# Patient Record
Sex: FEMALE
[De-identification: names, ages, dates, MRNs, and addresses within clinical notes are randomized per-mention and may not be internally consistent; named-entity substitution may affect disease eponyms.]

---

## 2020-01-09 ENCOUNTER — RESULT REVIEW (OUTPATIENT)
Age: 60
End: 2020-01-09

## 2020-01-09 ENCOUNTER — OUTPATIENT (OUTPATIENT)
Dept: OUTPATIENT SERVICES | Facility: HOSPITAL | Age: 60
LOS: 1 days | Discharge: HOME | End: 2020-01-09
Payer: COMMERCIAL

## 2020-01-09 VITALS
SYSTOLIC BLOOD PRESSURE: 126 MMHG | RESPIRATION RATE: 17 BRPM | DIASTOLIC BLOOD PRESSURE: 63 MMHG | HEART RATE: 67 BPM | OXYGEN SATURATION: 97 %

## 2020-01-09 VITALS
OXYGEN SATURATION: 98 % | DIASTOLIC BLOOD PRESSURE: 81 MMHG | SYSTOLIC BLOOD PRESSURE: 155 MMHG | HEART RATE: 80 BPM | HEIGHT: 62 IN | TEMPERATURE: 98 F | RESPIRATION RATE: 15 BRPM | WEIGHT: 145.06 LBS

## 2020-01-09 DIAGNOSIS — C18.7 MALIGNANT NEOPLASM OF SIGMOID COLON: ICD-10-CM

## 2020-01-09 PROCEDURE — 88305 TISSUE EXAM BY PATHOLOGIST: CPT | Mod: 26

## 2020-01-28 ENCOUNTER — OUTPATIENT (OUTPATIENT)
Dept: OUTPATIENT SERVICES | Facility: HOSPITAL | Age: 60
LOS: 1 days | End: 2020-01-28
Payer: COMMERCIAL

## 2020-01-28 VITALS
DIASTOLIC BLOOD PRESSURE: 80 MMHG | OXYGEN SATURATION: 97 % | HEIGHT: 62 IN | RESPIRATION RATE: 16 BRPM | HEART RATE: 77 BPM | TEMPERATURE: 97 F | SYSTOLIC BLOOD PRESSURE: 140 MMHG | WEIGHT: 147.93 LBS

## 2020-01-28 DIAGNOSIS — E03.9 HYPOTHYROIDISM, UNSPECIFIED: ICD-10-CM

## 2020-01-28 DIAGNOSIS — Z98.890 OTHER SPECIFIED POSTPROCEDURAL STATES: Chronic | ICD-10-CM

## 2020-01-28 DIAGNOSIS — C18.7 MALIGNANT NEOPLASM OF SIGMOID COLON: ICD-10-CM

## 2020-01-28 LAB
ANION GAP SERPL CALC-SCNC: 13 MMO/L — SIGNIFICANT CHANGE UP (ref 7–14)
BLD GP AB SCN SERPL QL: NEGATIVE — SIGNIFICANT CHANGE UP
BUN SERPL-MCNC: 13 MG/DL — SIGNIFICANT CHANGE UP (ref 7–23)
CALCIUM SERPL-MCNC: 8.8 MG/DL — SIGNIFICANT CHANGE UP (ref 8.4–10.5)
CHLORIDE SERPL-SCNC: 102 MMOL/L — SIGNIFICANT CHANGE UP (ref 98–107)
CO2 SERPL-SCNC: 28 MMOL/L — SIGNIFICANT CHANGE UP (ref 22–31)
CREAT SERPL-MCNC: 0.88 MG/DL — SIGNIFICANT CHANGE UP (ref 0.5–1.3)
GLUCOSE SERPL-MCNC: 122 MG/DL — HIGH (ref 70–99)
HBA1C BLD-MCNC: 6 % — HIGH (ref 4–5.6)
HCT VFR BLD CALC: 41.6 % — SIGNIFICANT CHANGE UP (ref 34.5–45)
HGB BLD-MCNC: 13.4 G/DL — SIGNIFICANT CHANGE UP (ref 11.5–15.5)
MCHC RBC-ENTMCNC: 29.1 PG — SIGNIFICANT CHANGE UP (ref 27–34)
MCHC RBC-ENTMCNC: 32.2 % — SIGNIFICANT CHANGE UP (ref 32–36)
MCV RBC AUTO: 90.2 FL — SIGNIFICANT CHANGE UP (ref 80–100)
NRBC # FLD: 0 K/UL — SIGNIFICANT CHANGE UP (ref 0–0)
PLATELET # BLD AUTO: 200 K/UL — SIGNIFICANT CHANGE UP (ref 150–400)
PMV BLD: 12.8 FL — SIGNIFICANT CHANGE UP (ref 7–13)
POTASSIUM SERPL-MCNC: 4.4 MMOL/L — SIGNIFICANT CHANGE UP (ref 3.5–5.3)
POTASSIUM SERPL-SCNC: 4.4 MMOL/L — SIGNIFICANT CHANGE UP (ref 3.5–5.3)
RBC # BLD: 4.61 M/UL — SIGNIFICANT CHANGE UP (ref 3.8–5.2)
RBC # FLD: 13.2 % — SIGNIFICANT CHANGE UP (ref 10.3–14.5)
RH IG SCN BLD-IMP: POSITIVE — SIGNIFICANT CHANGE UP
SODIUM SERPL-SCNC: 143 MMOL/L — SIGNIFICANT CHANGE UP (ref 135–145)
WBC # BLD: 8.16 K/UL — SIGNIFICANT CHANGE UP (ref 3.8–10.5)
WBC # FLD AUTO: 8.16 K/UL — SIGNIFICANT CHANGE UP (ref 3.8–10.5)

## 2020-01-28 PROCEDURE — 93010 ELECTROCARDIOGRAM REPORT: CPT

## 2020-01-28 NOTE — H&P PST ADULT - NEGATIVE MUSCULOSKELETAL SYMPTOMS
no muscle cramps/no back pain/no joint swelling/no myalgia/no arm pain L/no arm pain R/no neck pain/no leg pain R/no leg pain L

## 2020-01-28 NOTE — H&P PST ADULT - RS GEN PE MLT RESP DETAILS PC
no rhonchi/breath sounds equal/no rales/no wheezes/airway patent/respirations non-labored/good air movement

## 2020-01-28 NOTE — H&P PST ADULT - HISTORY OF PRESENT ILLNESS
malignant neoplasm of the sigmoid colon This 59 year old with history of malignant neoplasm of the sigmoid colon found on recent screening colonoscopy 12/2019,   presents to [facility] for evaluation scheduled laparoscopic sigmoid resection on 2/3/2020.

## 2020-01-28 NOTE — H&P PST ADULT - NSICDXPASTMEDICALHX_GEN_ALL_CORE_FT
PAST MEDICAL HISTORY:  H/O hypercalcemia 2011    Hypothyroid     Malignant neoplasm of sigmoid colon 12/2019    S/P colonoscopy x 2 :12/2019/1/2020

## 2020-01-28 NOTE — H&P PST ADULT - NEGATIVE NEUROLOGICAL SYMPTOMS
no loss of sensation/no headache/no syncope/no difficulty walking/no loss of consciousness/no paresthesias/no weakness/no generalized seizures/no confusion/no focal seizures

## 2020-01-28 NOTE — H&P PST ADULT - NEGATIVE ENMT SYMPTOMS
no dysphagia/no hearing difficulty/no throat pain/no ear pain/no sinus symptoms/no nose bleeds/no gum bleeding

## 2020-01-28 NOTE — H&P PST ADULT - NEGATIVE OPHTHALMOLOGIC SYMPTOMS
no blurred vision L/no blurred vision R/no pain R/no loss of vision L/no diplopia/no discharge L/no pain L/no loss of vision R/no discharge R

## 2020-01-28 NOTE — H&P PST ADULT - LANGUAGE ASSISTANCE NEEDED
Patient understands and verbalizes in English, and has requested her daughter to translate information as well

## 2020-01-28 NOTE — H&P PST ADULT - NSICDXPROBLEM_GEN_ALL_CORE_FT
PROBLEM DIAGNOSES  Problem: Hypothyroid  Assessment and Plan: Pt instructed to take their home medication levothyroxine the morning of surgery with a sip of water, pt able to verbalize understanding.     Problem: Malignant neoplasm of sigmoid colon  Assessment and Plan: Patient is scheduled for laparoscopic sigmoid resection on 2/3/2020. Pre-op instructions provided. Pt given verbal and written instructions with teach back on chlorhexidine soap and pepcid. Pt verbalized understanding with return demonstration.   Patient's surgeon has requested medical evaluation, PST to request copy of evaluation PROBLEM DIAGNOSES  Problem: Malignant neoplasm of sigmoid colon  Assessment and Plan: Patient is scheduled for laparoscopic sigmoid resection on 2/3/2020. Pre-op instructions provided. Pt given verbal and written instructions with teach back on chlorhexidine soap and pepcid. Pt verbalized understanding with return demonstration.   Patient's surgeon has requested medical evaluation, PST to request copy of evaluation       Problem: Hypothyroid  Assessment and Plan: Pt instructed to take their home medication levothyroxine the morning of surgery with a sip of water, pt able to verbalize understanding.

## 2020-01-31 ENCOUNTER — APPOINTMENT (OUTPATIENT)
Dept: CARDIOLOGY | Facility: CLINIC | Age: 60
End: 2020-01-31

## 2020-02-02 ENCOUNTER — TRANSCRIPTION ENCOUNTER (OUTPATIENT)
Age: 60
End: 2020-02-02

## 2020-02-03 ENCOUNTER — INPATIENT (INPATIENT)
Facility: HOSPITAL | Age: 60
LOS: 3 days | Discharge: HOME | End: 2020-02-07
Attending: SURGERY | Admitting: SURGERY
Payer: COMMERCIAL

## 2020-02-03 ENCOUNTER — RESULT REVIEW (OUTPATIENT)
Age: 60
End: 2020-02-03

## 2020-02-03 VITALS
SYSTOLIC BLOOD PRESSURE: 153 MMHG | TEMPERATURE: 98 F | OXYGEN SATURATION: 98 % | WEIGHT: 147.93 LBS | DIASTOLIC BLOOD PRESSURE: 60 MMHG | HEART RATE: 77 BPM | HEIGHT: 62 IN | RESPIRATION RATE: 16 BRPM

## 2020-02-03 DIAGNOSIS — C18.7 MALIGNANT NEOPLASM OF SIGMOID COLON: ICD-10-CM

## 2020-02-03 DIAGNOSIS — Z98.890 OTHER SPECIFIED POSTPROCEDURAL STATES: Chronic | ICD-10-CM

## 2020-02-03 LAB
GLUCOSE BLDC GLUCOMTR-MCNC: 102 MG/DL — HIGH (ref 70–99)
RH IG SCN BLD-IMP: POSITIVE — SIGNIFICANT CHANGE UP

## 2020-02-03 PROCEDURE — 88342 IMHCHEM/IMCYTCHM 1ST ANTB: CPT | Mod: 26

## 2020-02-03 PROCEDURE — 88341 IMHCHEM/IMCYTCHM EA ADD ANTB: CPT | Mod: 26

## 2020-02-03 PROCEDURE — 88305 TISSUE EXAM BY PATHOLOGIST: CPT | Mod: 26

## 2020-02-03 PROCEDURE — 88309 TISSUE EXAM BY PATHOLOGIST: CPT | Mod: 26

## 2020-02-03 RX ORDER — DIPHENHYDRAMINE HCL 50 MG
25 CAPSULE ORAL EVERY 4 HOURS
Refills: 0 | Status: DISCONTINUED | OUTPATIENT
Start: 2020-02-03 | End: 2020-02-05

## 2020-02-03 RX ORDER — MULTIVIT-MIN/FERROUS GLUCONATE 9 MG/15 ML
1 LIQUID (ML) ORAL
Qty: 0 | Refills: 0 | DISCHARGE

## 2020-02-03 RX ORDER — ACETAMINOPHEN 500 MG
2 TABLET ORAL
Qty: 0 | Refills: 0 | DISCHARGE

## 2020-02-03 RX ORDER — HYDROMORPHONE HYDROCHLORIDE 2 MG/ML
30 INJECTION INTRAMUSCULAR; INTRAVENOUS; SUBCUTANEOUS
Refills: 0 | Status: DISCONTINUED | OUTPATIENT
Start: 2020-02-03 | End: 2020-02-05

## 2020-02-03 RX ORDER — METRONIDAZOLE 500 MG
500 TABLET ORAL EVERY 8 HOURS
Refills: 0 | Status: COMPLETED | OUTPATIENT
Start: 2020-02-03 | End: 2020-02-04

## 2020-02-03 RX ORDER — LEVOTHYROXINE SODIUM 125 MCG
1 TABLET ORAL
Qty: 0 | Refills: 0 | DISCHARGE

## 2020-02-03 RX ORDER — KETOROLAC TROMETHAMINE 30 MG/ML
30 SYRINGE (ML) INJECTION EVERY 6 HOURS
Refills: 0 | Status: DISCONTINUED | OUTPATIENT
Start: 2020-02-04 | End: 2020-02-04

## 2020-02-03 RX ORDER — HYDROMORPHONE HYDROCHLORIDE 2 MG/ML
0.5 INJECTION INTRAMUSCULAR; INTRAVENOUS; SUBCUTANEOUS
Refills: 0 | Status: DISCONTINUED | OUTPATIENT
Start: 2020-02-03 | End: 2020-02-05

## 2020-02-03 RX ORDER — NALOXONE HYDROCHLORIDE 4 MG/.1ML
0.1 SPRAY NASAL
Refills: 0 | Status: DISCONTINUED | OUTPATIENT
Start: 2020-02-03 | End: 2020-02-05

## 2020-02-03 RX ORDER — ENOXAPARIN SODIUM 100 MG/ML
40 INJECTION SUBCUTANEOUS DAILY
Refills: 0 | Status: DISCONTINUED | OUTPATIENT
Start: 2020-02-03 | End: 2020-02-07

## 2020-02-03 RX ORDER — CIPROFLOXACIN LACTATE 400MG/40ML
400 VIAL (ML) INTRAVENOUS EVERY 12 HOURS
Refills: 0 | Status: COMPLETED | OUTPATIENT
Start: 2020-02-03 | End: 2020-02-04

## 2020-02-03 RX ORDER — HYDROMORPHONE HYDROCHLORIDE 2 MG/ML
0.5 INJECTION INTRAMUSCULAR; INTRAVENOUS; SUBCUTANEOUS
Refills: 0 | Status: DISCONTINUED | OUTPATIENT
Start: 2020-02-03 | End: 2020-02-04

## 2020-02-03 RX ORDER — CHOLECALCIFEROL (VITAMIN D3) 125 MCG
1 CAPSULE ORAL
Qty: 0 | Refills: 0 | DISCHARGE

## 2020-02-03 RX ORDER — ONDANSETRON 8 MG/1
4 TABLET, FILM COATED ORAL EVERY 6 HOURS
Refills: 0 | Status: DISCONTINUED | OUTPATIENT
Start: 2020-02-03 | End: 2020-02-05

## 2020-02-03 RX ORDER — SODIUM CHLORIDE 9 MG/ML
1000 INJECTION, SOLUTION INTRAVENOUS
Refills: 0 | Status: DISCONTINUED | OUTPATIENT
Start: 2020-02-03 | End: 2020-02-04

## 2020-02-03 RX ORDER — ONDANSETRON 8 MG/1
4 TABLET, FILM COATED ORAL ONCE
Refills: 0 | Status: DISCONTINUED | OUTPATIENT
Start: 2020-02-03 | End: 2020-02-07

## 2020-02-03 RX ORDER — ACETAMINOPHEN 500 MG
1000 TABLET ORAL EVERY 6 HOURS
Refills: 0 | Status: COMPLETED | OUTPATIENT
Start: 2020-02-03 | End: 2020-02-04

## 2020-02-03 RX ORDER — ACETAMINOPHEN 500 MG
1000 TABLET ORAL EVERY 6 HOURS
Refills: 0 | Status: DISCONTINUED | OUTPATIENT
Start: 2020-02-03 | End: 2020-02-03

## 2020-02-03 RX ADMIN — Medication 400 MILLIGRAM(S): at 23:36

## 2020-02-03 RX ADMIN — ENOXAPARIN SODIUM 40 MILLIGRAM(S): 100 INJECTION SUBCUTANEOUS at 21:10

## 2020-02-03 RX ADMIN — SODIUM CHLORIDE 30 MILLILITER(S): 9 INJECTION, SOLUTION INTRAVENOUS at 13:47

## 2020-02-03 RX ADMIN — HYDROMORPHONE HYDROCHLORIDE 0.5 MILLIGRAM(S): 2 INJECTION INTRAMUSCULAR; INTRAVENOUS; SUBCUTANEOUS at 19:45

## 2020-02-03 RX ADMIN — HYDROMORPHONE HYDROCHLORIDE 0.5 MILLIGRAM(S): 2 INJECTION INTRAMUSCULAR; INTRAVENOUS; SUBCUTANEOUS at 19:24

## 2020-02-03 RX ADMIN — HYDROMORPHONE HYDROCHLORIDE 30 MILLILITER(S): 2 INJECTION INTRAMUSCULAR; INTRAVENOUS; SUBCUTANEOUS at 19:30

## 2020-02-03 RX ADMIN — Medication 200 MILLIGRAM(S): at 21:07

## 2020-02-03 NOTE — ASU PATIENT PROFILE, ADULT - PMH
H/O hypercalcemia  2011  Hypothyroid    Malignant neoplasm of sigmoid colon  12/2019  S/P colonoscopy  x 2 :12/2019/1/2020

## 2020-02-03 NOTE — BRIEF OPERATIVE NOTE - OPERATION/FINDINGS
Laparoscopic-assisted sigmoid resection performed. The ink injection was identified on the sigmoid colon. The splenic flexure was mobilized, but the IMV was not divided. A side-to-end, stapled, Baker's anastomosis of descending colon to sigmoid cuff performed. Blind end of descending colon and colo-sigmoid anastomosis over-sewn with interrupted silk sutures. Rigid sigmoidoscopy performed - no fresh blood within rectum/sigmoid, and no air leak present. Omentum was draped into pelvis over anastomosis.

## 2020-02-03 NOTE — ASU PATIENT PROFILE, ADULT - HEALTH/HEALTHCARE ANXIETIES, PROFILE
[Cardiac Auscultation] : normal cardiac auscultation  [Respiratory Effort] : normal respiratory effort [Auscultation] : lungs clear to auscultation [Liver] : normal liver [Spleen] : normal spleen [Muscle Strength] : normal muscle strength [Gait] : normal gait [Grossly Intact] : grossly intact [Normal] : normal [Peripheral Edema] : no peripheral edema  [Tenderness] : non tender [FreeTextEntry1] : obese. +Cushingoid [de-identified] : no signs of arthritis. +hypermobile. +pes planus none

## 2020-02-03 NOTE — CHART NOTE - NSCHARTNOTEFT_GEN_A_CORE
POST-OPERATIVE NOTE    Patient is s/p laparoscopic assisted sigmoid resectionwith side to end Baker's anastamosis    Subjective:  Patient reports mild pain at the incisional site, controlled with medication  Denies chest pain, shortness of breath, nausea, vomiting  Is not yet passing gas or having bowel movements  Not yet urinating independently (jimenes) or ambulating independently    Vital Signs Last 24 Hrs  T(C): 36.9 (03 Feb 2020 20:57), Max: 36.9 (03 Feb 2020 13:07)  T(F): 98.4 (03 Feb 2020 20:57), Max: 98.4 (03 Feb 2020 13:07)  HR: 66 (03 Feb 2020 22:00) (65 - 77)  BP: 131/59 (03 Feb 2020 20:57) (120/59 - 153/60)  BP(mean): 75 (03 Feb 2020 20:57) (70 - 82)  RR: 16 (03 Feb 2020 22:00) (13 - 21)  SpO2: 100% (03 Feb 2020 22:00) (94% - 100%)  I&O's Detail    03 Feb 2020 07:01  -  03 Feb 2020 23:00  --------------------------------------------------------  IN:    lactated ringers.: 400 mL  Total IN: 400 mL    OUT:    Bulb: 43 mL    Indwelling Catheter - Urethral: 280 mL  Total OUT: 323 mL    Total NET: 77 mL        ciprofloxacin   IVPB 400  metroNIDAZOLE  IVPB 500  ciprofloxacin   IVPB 400  enoxaparin Injectable 40  metroNIDAZOLE  IVPB 500    PAST MEDICAL & SURGICAL HISTORY:  S/P colonoscopy: x 2 :12/2019/1/2020  H/O hypercalcemia: 2011  Hypothyroid  Malignant neoplasm of sigmoid colon: 12/2019  S/P parathyroidectomy: 2011  S/P partial thyroidectomy: 2011        Physical Exam:  General: NAD, resting comfortably in bed  Pulmonary: Nonlabored breathing, no respiratory distress  Abdominal: soft, tender around the midline incision but not always, nondistended. laparoscopic incisions covered, CDI; Midline incision covered with dressing, very minor seepage. DAWSON RLQ ss output, site CDI.  Extremities: WWP      Assessment:  The patient is a 59y Female who is now several hours post-op from a  laparoscopic assisted sigmoid resection with side to end Baker's anastomosis, recovering appropriately in the PACU.    Plan:  - OK to be transferred to floor  - Pain control as needed, PCA  - continues NPO/IVF @100cc  - contineu IV antibitoics 24hrs (cipro/flagyl)  - DVT ppx lovenox  - OOB and ambulating as tolerated

## 2020-02-04 LAB
ANION GAP SERPL CALC-SCNC: 11 MMO/L — SIGNIFICANT CHANGE UP (ref 7–14)
BASOPHILS # BLD AUTO: 0.02 K/UL — SIGNIFICANT CHANGE UP (ref 0–0.2)
BASOPHILS NFR BLD AUTO: 0.2 % — SIGNIFICANT CHANGE UP (ref 0–2)
BUN SERPL-MCNC: 8 MG/DL — SIGNIFICANT CHANGE UP (ref 7–23)
CALCIUM SERPL-MCNC: 7.8 MG/DL — LOW (ref 8.4–10.5)
CHLORIDE SERPL-SCNC: 104 MMOL/L — SIGNIFICANT CHANGE UP (ref 98–107)
CO2 SERPL-SCNC: 24 MMOL/L — SIGNIFICANT CHANGE UP (ref 22–31)
CREAT SERPL-MCNC: 0.64 MG/DL — SIGNIFICANT CHANGE UP (ref 0.5–1.3)
EOSINOPHIL # BLD AUTO: 0 K/UL — SIGNIFICANT CHANGE UP (ref 0–0.5)
EOSINOPHIL NFR BLD AUTO: 0 % — SIGNIFICANT CHANGE UP (ref 0–6)
GLUCOSE SERPL-MCNC: 162 MG/DL — HIGH (ref 70–99)
HCT VFR BLD CALC: 36.2 % — SIGNIFICANT CHANGE UP (ref 34.5–45)
HGB BLD-MCNC: 11.9 G/DL — SIGNIFICANT CHANGE UP (ref 11.5–15.5)
IMM GRANULOCYTES NFR BLD AUTO: 0.8 % — SIGNIFICANT CHANGE UP (ref 0–1.5)
LYMPHOCYTES # BLD AUTO: 1.08 K/UL — SIGNIFICANT CHANGE UP (ref 1–3.3)
LYMPHOCYTES # BLD AUTO: 9.7 % — LOW (ref 13–44)
MAGNESIUM SERPL-MCNC: 1.6 MG/DL — SIGNIFICANT CHANGE UP (ref 1.6–2.6)
MCHC RBC-ENTMCNC: 29.2 PG — SIGNIFICANT CHANGE UP (ref 27–34)
MCHC RBC-ENTMCNC: 32.9 % — SIGNIFICANT CHANGE UP (ref 32–36)
MCV RBC AUTO: 88.9 FL — SIGNIFICANT CHANGE UP (ref 80–100)
MONOCYTES # BLD AUTO: 0.6 K/UL — SIGNIFICANT CHANGE UP (ref 0–0.9)
MONOCYTES NFR BLD AUTO: 5.4 % — SIGNIFICANT CHANGE UP (ref 2–14)
NEUTROPHILS # BLD AUTO: 9.36 K/UL — HIGH (ref 1.8–7.4)
NEUTROPHILS NFR BLD AUTO: 83.9 % — HIGH (ref 43–77)
NRBC # FLD: 0 K/UL — SIGNIFICANT CHANGE UP (ref 0–0)
PHOSPHATE SERPL-MCNC: 4 MG/DL — SIGNIFICANT CHANGE UP (ref 2.5–4.5)
PLATELET # BLD AUTO: 159 K/UL — SIGNIFICANT CHANGE UP (ref 150–400)
PMV BLD: 12.4 FL — SIGNIFICANT CHANGE UP (ref 7–13)
POTASSIUM SERPL-MCNC: 4.6 MMOL/L — SIGNIFICANT CHANGE UP (ref 3.5–5.3)
POTASSIUM SERPL-SCNC: 4.6 MMOL/L — SIGNIFICANT CHANGE UP (ref 3.5–5.3)
RBC # BLD: 4.07 M/UL — SIGNIFICANT CHANGE UP (ref 3.8–5.2)
RBC # FLD: 13.3 % — SIGNIFICANT CHANGE UP (ref 10.3–14.5)
SODIUM SERPL-SCNC: 139 MMOL/L — SIGNIFICANT CHANGE UP (ref 135–145)
WBC # BLD: 11.15 K/UL — HIGH (ref 3.8–10.5)
WBC # FLD AUTO: 11.15 K/UL — HIGH (ref 3.8–10.5)

## 2020-02-04 RX ORDER — SODIUM CHLORIDE 9 MG/ML
1000 INJECTION, SOLUTION INTRAVENOUS
Refills: 0 | Status: DISCONTINUED | OUTPATIENT
Start: 2020-02-04 | End: 2020-02-05

## 2020-02-04 RX ORDER — MAGNESIUM SULFATE 500 MG/ML
2 VIAL (ML) INJECTION ONCE
Refills: 0 | Status: COMPLETED | OUTPATIENT
Start: 2020-02-04 | End: 2020-02-04

## 2020-02-04 RX ORDER — LEVOTHYROXINE SODIUM 125 MCG
75 TABLET ORAL DAILY
Refills: 0 | Status: DISCONTINUED | OUTPATIENT
Start: 2020-02-04 | End: 2020-02-07

## 2020-02-04 RX ADMIN — HYDROMORPHONE HYDROCHLORIDE 30 MILLILITER(S): 2 INJECTION INTRAMUSCULAR; INTRAVENOUS; SUBCUTANEOUS at 08:41

## 2020-02-04 RX ADMIN — Medication 400 MILLIGRAM(S): at 11:54

## 2020-02-04 RX ADMIN — Medication 30 MILLIGRAM(S): at 03:20

## 2020-02-04 RX ADMIN — ENOXAPARIN SODIUM 40 MILLIGRAM(S): 100 INJECTION SUBCUTANEOUS at 11:53

## 2020-02-04 RX ADMIN — SODIUM CHLORIDE 100 MILLILITER(S): 9 INJECTION, SOLUTION INTRAVENOUS at 10:23

## 2020-02-04 RX ADMIN — Medication 100 MILLIGRAM(S): at 16:48

## 2020-02-04 RX ADMIN — Medication 30 MILLIGRAM(S): at 14:18

## 2020-02-04 RX ADMIN — Medication 30 MILLIGRAM(S): at 21:49

## 2020-02-04 RX ADMIN — Medication 30 MILLIGRAM(S): at 02:50

## 2020-02-04 RX ADMIN — Medication 200 MILLIGRAM(S): at 06:33

## 2020-02-04 RX ADMIN — Medication 1000 MILLIGRAM(S): at 12:25

## 2020-02-04 RX ADMIN — Medication 1000 MILLIGRAM(S): at 17:30

## 2020-02-04 RX ADMIN — Medication 30 MILLIGRAM(S): at 08:02

## 2020-02-04 RX ADMIN — Medication 100 MILLIGRAM(S): at 07:32

## 2020-02-04 RX ADMIN — HYDROMORPHONE HYDROCHLORIDE 30 MILLILITER(S): 2 INJECTION INTRAMUSCULAR; INTRAVENOUS; SUBCUTANEOUS at 20:13

## 2020-02-04 RX ADMIN — HYDROMORPHONE HYDROCHLORIDE 30 MILLILITER(S): 2 INJECTION INTRAMUSCULAR; INTRAVENOUS; SUBCUTANEOUS at 01:42

## 2020-02-04 RX ADMIN — Medication 30 MILLIGRAM(S): at 07:32

## 2020-02-04 RX ADMIN — Medication 50 GRAM(S): at 10:23

## 2020-02-04 RX ADMIN — Medication 1000 MILLIGRAM(S): at 06:45

## 2020-02-04 RX ADMIN — Medication 400 MILLIGRAM(S): at 06:30

## 2020-02-04 RX ADMIN — Medication 30 MILLIGRAM(S): at 14:48

## 2020-02-04 RX ADMIN — Medication 30 MILLIGRAM(S): at 21:34

## 2020-02-04 RX ADMIN — Medication 100 MILLIGRAM(S): at 00:04

## 2020-02-04 RX ADMIN — Medication 400 MILLIGRAM(S): at 16:59

## 2020-02-04 RX ADMIN — Medication 200 MILLIGRAM(S): at 17:52

## 2020-02-04 RX ADMIN — HYDROMORPHONE HYDROCHLORIDE 30 MILLILITER(S): 2 INJECTION INTRAMUSCULAR; INTRAVENOUS; SUBCUTANEOUS at 01:41

## 2020-02-04 NOTE — PHYSICAL THERAPY INITIAL EVALUATION ADULT - PERTINENT HX OF CURRENT PROBLEM, REHAB EVAL
Patient is a 59 year old female presenting for PST of scheduled laparoscopic sigmoid resection. Recent colonoscopy (12/2019) found to have malignant neoplasm of the sigmoid colon. Patient now s/p above listed procedure.

## 2020-02-04 NOTE — PHYSICAL THERAPY INITIAL EVALUATION ADULT - ADDITIONAL COMMENTS
Patient lives in a house with no exterior steps to enter. 5 steps within. Patient lives with family, whom are available to help as needed. Prior to admission, patient reports ambulating independently, no assistive device.     Patient was left semi-supine in bed as found, all lines/tubes intact and call bell within reach, RN aware.

## 2020-02-04 NOTE — PROGRESS NOTE ADULT - SUBJECTIVE AND OBJECTIVE BOX
A Team Surgery Progress Note      Pt seen and examined at bedside. Pt with no complaints. Pt reports + flatus. Denies N/V. States pain well controlled        OBJECTIVE: T(C): 36.5 (02-04-20 @ 06:35), Max: 36.9 (02-03-20 @ 13:07)  HR: 77 (02-04-20 @ 06:35) (65 - 77)  BP: 122/63 (02-04-20 @ 06:35) (119/71 - 153/60)  RR: 16 (02-04-20 @ 06:35) (13 - 21)  SpO2: 96% (02-04-20 @ 06:35) (93% - 100%)  Wt(kg): --  I&O's Summary    03 Feb 2020 07:01  -  04 Feb 2020 07:00  --------------------------------------------------------  IN: 1800 mL / OUT: 945.5 mL / NET: 854.5 mL      I&O's Detail    03 Feb 2020 07:01  -  04 Feb 2020 07:00  --------------------------------------------------------  IN:    IV PiggyBack: 600 mL    lactated ringers.: 1200 mL  Total IN: 1800 mL    OUT:    Bulb: 105.5 mL    Indwelling Catheter - Urethral: 840 mL  Total OUT: 945.5 mL    Total NET: 854.5 mL    Exam   Gen: NAD  Abdomen: soft, NT/ND, dressing C/D/I  Drain w/slightly bloody tinged output    MEDICATIONS  (STANDING):  acetaminophen  IVPB .. 1000 milliGRAM(s) IV Intermittent every 6 hours  ciprofloxacin   IVPB 400 milliGRAM(s) IV Intermittent every 12 hours  dextrose 5% + sodium chloride 0.45%. 1000 milliLiter(s) (100 mL/Hr) IV Continuous <Continuous>  enoxaparin Injectable 40 milliGRAM(s) SubCutaneous daily  HYDROmorphone PCA (1 mG/mL) 30 milliLiter(s) PCA Continuous PCA Continuous  ketorolac   Injectable 30 milliGRAM(s) IV Push every 6 hours  magnesium sulfate  IVPB 2 Gram(s) IV Intermittent once  metroNIDAZOLE  IVPB 500 milliGRAM(s) IV Intermittent every 8 hours    MEDICATIONS  (PRN):  diphenhydrAMINE   Injectable 25 milliGRAM(s) IV Push every 4 hours PRN Pruritus  HYDROmorphone  Injectable 0.5 milliGRAM(s) IV Push every 10 minutes PRN Moderate Pain (4 - 6)  HYDROmorphone PCA (1 mG/mL) Rescue Clinician Bolus 0.5 milliGRAM(s) IV Push every 15 minutes PRN for Pain Scale GREATER THAN 6  naloxone Injectable 0.1 milliGRAM(s) IV Push every 3 minutes PRN For ANY of the following changes in patient status:  A. RR LESS THAN 10 breaths per minute, B. Oxygen saturation LESS THAN 90%, C. Sedation score of 6  ondansetron Injectable 4 milliGRAM(s) IV Push every 6 hours PRN Nausea  ondansetron Injectable 4 milliGRAM(s) IV Push once PRN Nausea and/or Vomiting      LABS:                        11.9   11.15 )-----------( 159      ( 04 Feb 2020 07:08 )             36.2     02-04    139  |  104  |  8   ----------------------------<  162<H>  4.6   |  24  |  0.64    Ca    7.8<L>      04 Feb 2020 07:08  Phos  4.0     02-04  Mg     1.6     02-04            RADIOLOGY & ADDITIONAL STUDIES:    Assessment:  59y Female who is now several hours post-op from a  laparoscopic assisted sigmoid resection with side to end Baker's anastomosis    Plan:  - Sips of clears  - d/c jimenes, f/u TOV  - pain control IV APAP q6 and PCA  - OOB/amb  - DVT ppx lovenox  - OOB and ambulating as tolerated.

## 2020-02-04 NOTE — PROGRESS NOTE ADULT - SUBJECTIVE AND OBJECTIVE BOX
Anesthesia Pain Management Service    SUBJECTIVE: Patient is doing well with IV PCA and no significant problems reported.    Pain Scale Score	At rest: _4/10__ 	With Activity: ___ 	[X ] Refer to charted pain scores    THERAPY:    [ ] IV PCA Morphine		[ ] 5 mg/mL	[ ] 1 mg/mL  [X ] IV PCA Hydromorphone	[ ] 5 mg/mL	[X ] 1 mg/mL  [ ] IV PCA Fentanyl		[ ] 50 micrograms/mL    Demand dose __0.2_ lockout __6_ (minutes) Continuous Rate _0__ Total: _1.20__  mg used (in past 24 hours)      MEDICATIONS  (STANDING):  acetaminophen  IVPB .. 1000 milliGRAM(s) IV Intermittent every 6 hours  ciprofloxacin   IVPB 400 milliGRAM(s) IV Intermittent every 12 hours  dextrose 5% + sodium chloride 0.45%. 1000 milliLiter(s) (100 mL/Hr) IV Continuous <Continuous>  enoxaparin Injectable 40 milliGRAM(s) SubCutaneous daily  HYDROmorphone PCA (1 mG/mL) 30 milliLiter(s) PCA Continuous PCA Continuous  ketorolac   Injectable 30 milliGRAM(s) IV Push every 6 hours  magnesium sulfate  IVPB 2 Gram(s) IV Intermittent once  metroNIDAZOLE  IVPB 500 milliGRAM(s) IV Intermittent every 8 hours    MEDICATIONS  (PRN):  diphenhydrAMINE   Injectable 25 milliGRAM(s) IV Push every 4 hours PRN Pruritus  HYDROmorphone  Injectable 0.5 milliGRAM(s) IV Push every 10 minutes PRN Moderate Pain (4 - 6)  HYDROmorphone PCA (1 mG/mL) Rescue Clinician Bolus 0.5 milliGRAM(s) IV Push every 15 minutes PRN for Pain Scale GREATER THAN 6  naloxone Injectable 0.1 milliGRAM(s) IV Push every 3 minutes PRN For ANY of the following changes in patient status:  A. RR LESS THAN 10 breaths per minute, B. Oxygen saturation LESS THAN 90%, C. Sedation score of 6  ondansetron Injectable 4 milliGRAM(s) IV Push every 6 hours PRN Nausea  ondansetron Injectable 4 milliGRAM(s) IV Push once PRN Nausea and/or Vomiting      OBJECTIVE:    Sedation Score:	[ X] Alert	[ ] Drowsy 	[ ] Arousable	[ ] Asleep	[ ] Unresponsive    Side Effects:	[X ] None	[ ] Nausea	[ ] Vomiting	[ ] Pruritus  		[ ] Other:    Vital Signs Last 24 Hrs  T(C): 36.5 (04 Feb 2020 06:35), Max: 36.9 (03 Feb 2020 13:07)  T(F): 97.7 (04 Feb 2020 06:35), Max: 98.4 (03 Feb 2020 13:07)  HR: 77 (04 Feb 2020 06:35) (65 - 77)  BP: 122/63 (04 Feb 2020 06:35) (119/71 - 153/60)  BP(mean): 75 (03 Feb 2020 20:57) (70 - 82)  RR: 16 (04 Feb 2020 06:35) (13 - 21)  SpO2: 96% (04 Feb 2020 06:35) (93% - 100%)    ASSESSMENT/ PLAN    Therapy to  be:	[ X] Continue   [ ] Discontinued   [ ] Change to prn Analgesics    Documentation and Verification of current medications:   [X] Done	[ ] Not done, not elligible    Comments: Recommend non-opioid adjuvant analgesics to be used when possible and when allowed by primary surgical team.    Progress Note written now but Patient was seen earlier.

## 2020-02-04 NOTE — PHYSICAL THERAPY INITIAL EVALUATION ADULT - GENERAL OBSERVATIONS, REHAB EVAL
Patient received semisupine in bed, +DAWSON drain, +IV, +pulse oximeter, in no apparent distress.  at bedside. Patient agreeable to participate in physical therapy evaluation.

## 2020-02-04 NOTE — PHYSICAL THERAPY INITIAL EVALUATION ADULT - DISCHARGE DISPOSITION, PT EVAL
Anticipated discharge to home with home physical therapy services for recent surgery to improve functional mobility and strength, to optimize safety within the home environment.

## 2020-02-04 NOTE — PROGRESS NOTE ADULT - SUBJECTIVE AND OBJECTIVE BOX
ANESTHESIA POSTOP CHECK    59y Female POSTOP DAY 1 S/P lap sigmoid resection. Pt doing well, pain well controlled. Denies any N/V, NPO will advance to sips of clears per team today. Pt is alert and oriented, and denies any additional complaints.     Vital Signs Last 24 Hrs  T(C): 36.5 (04 Feb 2020 06:35), Max: 36.9 (03 Feb 2020 13:07)  T(F): 97.7 (04 Feb 2020 06:35), Max: 98.4 (03 Feb 2020 13:07)  HR: 77 (04 Feb 2020 06:35) (65 - 77)  BP: 122/63 (04 Feb 2020 06:35) (119/71 - 153/60)  BP(mean): 75 (03 Feb 2020 20:57) (70 - 82)  RR: 16 (04 Feb 2020 06:35) (13 - 21)  SpO2: 96% (04 Feb 2020 06:35) (93% - 100%)  I&O's Summary    03 Feb 2020 07:01  -  04 Feb 2020 07:00  --------------------------------------------------------  IN: 1800 mL / OUT: 945.5 mL / NET: 854.5 mL        [X ] NO APPARENT ANESTHESIA COMPLICATIONS      Comments:

## 2020-02-04 NOTE — PROGRESS NOTE ADULT - ASSESSMENT
59F POD#1 s/p lap assisted sigmoidectomy for malignant colon polyp, recovering well    Pt seen and examined with Dr. Olivas  - Sips of clears  - d/c jimenes  - pain control IV APAP q6 and PCA  - OOB/amb

## 2020-02-04 NOTE — PROGRESS NOTE ADULT - SUBJECTIVE AND OBJECTIVE BOX
INTERVAL HPI/OVERNIGHT EVENTS: Pt seen and examined. Doing well. Pain managed. Passing flatus. Denies N/V.     STATUS POST:  Lap assisted sigmoidectomy    POST OPERATIVE DAY #: 1    MEDICATIONS  (STANDING):  acetaminophen  IVPB .. 1000 milliGRAM(s) IV Intermittent every 6 hours  ciprofloxacin   IVPB 400 milliGRAM(s) IV Intermittent every 12 hours  dextrose 5% + sodium chloride 0.45%. 1000 milliLiter(s) (100 mL/Hr) IV Continuous <Continuous>  enoxaparin Injectable 40 milliGRAM(s) SubCutaneous daily  HYDROmorphone PCA (1 mG/mL) 30 milliLiter(s) PCA Continuous PCA Continuous  ketorolac   Injectable 30 milliGRAM(s) IV Push every 6 hours  magnesium sulfate  IVPB 2 Gram(s) IV Intermittent once  metroNIDAZOLE  IVPB 500 milliGRAM(s) IV Intermittent every 8 hours    MEDICATIONS  (PRN):  diphenhydrAMINE   Injectable 25 milliGRAM(s) IV Push every 4 hours PRN Pruritus  HYDROmorphone  Injectable 0.5 milliGRAM(s) IV Push every 10 minutes PRN Moderate Pain (4 - 6)  HYDROmorphone PCA (1 mG/mL) Rescue Clinician Bolus 0.5 milliGRAM(s) IV Push every 15 minutes PRN for Pain Scale GREATER THAN 6  naloxone Injectable 0.1 milliGRAM(s) IV Push every 3 minutes PRN For ANY of the following changes in patient status:  A. RR LESS THAN 10 breaths per minute, B. Oxygen saturation LESS THAN 90%, C. Sedation score of 6  ondansetron Injectable 4 milliGRAM(s) IV Push every 6 hours PRN Nausea  ondansetron Injectable 4 milliGRAM(s) IV Push once PRN Nausea and/or Vomiting      Vital Signs Last 24 Hrs  T(C): 36.5 (04 Feb 2020 06:35), Max: 36.9 (03 Feb 2020 13:07)  T(F): 97.7 (04 Feb 2020 06:35), Max: 98.4 (03 Feb 2020 13:07)  HR: 77 (04 Feb 2020 06:35) (65 - 77)  BP: 122/63 (04 Feb 2020 06:35) (119/71 - 153/60)  BP(mean): 75 (03 Feb 2020 20:57) (70 - 82)  RR: 16 (04 Feb 2020 06:35) (13 - 21)  SpO2: 96% (04 Feb 2020 06:35) (93% - 100%)    PHYSICAL EXAM:      Constitutional: NAD    Respiratory: breathing comfortably on RA    Gastrointestinal: Abd soft, NT, ND. dressing c/d/i. Drain slightly bloody tinged output    Genitourinary: jimenes        I&O's Detail    03 Feb 2020 07:01  -  04 Feb 2020 07:00  --------------------------------------------------------  IN:    IV PiggyBack: 200 mL    lactated ringers.: 800 mL  Total IN: 1000 mL    OUT:    Bulb: 105.5 mL    Indwelling Catheter - Urethral: 840 mL  Total OUT: 945.5 mL    Total NET: 54.5 mL          LABS:                        11.9   11.15 )-----------( 159      ( 04 Feb 2020 07:08 )             36.2     02-04    139  |  104  |  8   ----------------------------<  162<H>  4.6   |  24  |  0.64    Ca    7.8<L>      04 Feb 2020 07:08  Phos  4.0     02-04  Mg     1.6     02-04            RADIOLOGY & ADDITIONAL STUDIES:

## 2020-02-04 NOTE — PHYSICAL THERAPY INITIAL EVALUATION ADULT - PATIENT PROFILE REVIEW, REHAB EVAL
PT orders received: Ambulate as tolerated. Consult with RN Rico, patient may participate in PT evaluation./yes

## 2020-02-05 LAB
ANION GAP SERPL CALC-SCNC: 12 MMO/L — SIGNIFICANT CHANGE UP (ref 7–14)
BUN SERPL-MCNC: 4 MG/DL — LOW (ref 7–23)
CALCIUM SERPL-MCNC: 7.5 MG/DL — LOW (ref 8.4–10.5)
CHLORIDE SERPL-SCNC: 106 MMOL/L — SIGNIFICANT CHANGE UP (ref 98–107)
CO2 SERPL-SCNC: 25 MMOL/L — SIGNIFICANT CHANGE UP (ref 22–31)
CREAT SERPL-MCNC: 0.52 MG/DL — SIGNIFICANT CHANGE UP (ref 0.5–1.3)
GLUCOSE SERPL-MCNC: 134 MG/DL — HIGH (ref 70–99)
HCT VFR BLD CALC: 36.1 % — SIGNIFICANT CHANGE UP (ref 34.5–45)
HGB BLD-MCNC: 11.8 G/DL — SIGNIFICANT CHANGE UP (ref 11.5–15.5)
MAGNESIUM SERPL-MCNC: 1.7 MG/DL — SIGNIFICANT CHANGE UP (ref 1.6–2.6)
MCHC RBC-ENTMCNC: 29.1 PG — SIGNIFICANT CHANGE UP (ref 27–34)
MCHC RBC-ENTMCNC: 32.7 % — SIGNIFICANT CHANGE UP (ref 32–36)
MCV RBC AUTO: 89.1 FL — SIGNIFICANT CHANGE UP (ref 80–100)
NRBC # FLD: 0 K/UL — SIGNIFICANT CHANGE UP (ref 0–0)
PHOSPHATE SERPL-MCNC: 2.2 MG/DL — LOW (ref 2.5–4.5)
PLATELET # BLD AUTO: 154 K/UL — SIGNIFICANT CHANGE UP (ref 150–400)
PMV BLD: 12.5 FL — SIGNIFICANT CHANGE UP (ref 7–13)
POTASSIUM SERPL-MCNC: 3.5 MMOL/L — SIGNIFICANT CHANGE UP (ref 3.5–5.3)
POTASSIUM SERPL-SCNC: 3.5 MMOL/L — SIGNIFICANT CHANGE UP (ref 3.5–5.3)
RBC # BLD: 4.05 M/UL — SIGNIFICANT CHANGE UP (ref 3.8–5.2)
RBC # FLD: 13.9 % — SIGNIFICANT CHANGE UP (ref 10.3–14.5)
SODIUM SERPL-SCNC: 143 MMOL/L — SIGNIFICANT CHANGE UP (ref 135–145)
WBC # BLD: 10.31 K/UL — SIGNIFICANT CHANGE UP (ref 3.8–10.5)
WBC # FLD AUTO: 10.31 K/UL — SIGNIFICANT CHANGE UP (ref 3.8–10.5)

## 2020-02-05 RX ORDER — POTASSIUM CHLORIDE 20 MEQ
10 PACKET (EA) ORAL
Refills: 0 | Status: COMPLETED | OUTPATIENT
Start: 2020-02-05 | End: 2020-02-05

## 2020-02-05 RX ORDER — SODIUM CHLORIDE 9 MG/ML
1000 INJECTION, SOLUTION INTRAVENOUS
Refills: 0 | Status: DISCONTINUED | OUTPATIENT
Start: 2020-02-05 | End: 2020-02-05

## 2020-02-05 RX ORDER — OXYCODONE HYDROCHLORIDE 5 MG/1
2.5 TABLET ORAL
Refills: 0 | Status: DISCONTINUED | OUTPATIENT
Start: 2020-02-05 | End: 2020-02-05

## 2020-02-05 RX ORDER — LABETALOL HCL 100 MG
10 TABLET ORAL ONCE
Refills: 0 | Status: DISCONTINUED | OUTPATIENT
Start: 2020-02-05 | End: 2020-02-05

## 2020-02-05 RX ORDER — OXYCODONE HYDROCHLORIDE 5 MG/1
10 TABLET ORAL EVERY 4 HOURS
Refills: 0 | Status: DISCONTINUED | OUTPATIENT
Start: 2020-02-05 | End: 2020-02-07

## 2020-02-05 RX ORDER — POTASSIUM PHOSPHATE, MONOBASIC POTASSIUM PHOSPHATE, DIBASIC 236; 224 MG/ML; MG/ML
15 INJECTION, SOLUTION INTRAVENOUS ONCE
Refills: 0 | Status: COMPLETED | OUTPATIENT
Start: 2020-02-05 | End: 2020-02-05

## 2020-02-05 RX ORDER — OXYCODONE HYDROCHLORIDE 5 MG/1
5 TABLET ORAL
Refills: 0 | Status: DISCONTINUED | OUTPATIENT
Start: 2020-02-05 | End: 2020-02-05

## 2020-02-05 RX ORDER — MAGNESIUM SULFATE 500 MG/ML
2 VIAL (ML) INJECTION ONCE
Refills: 0 | Status: COMPLETED | OUTPATIENT
Start: 2020-02-05 | End: 2020-02-05

## 2020-02-05 RX ORDER — OXYCODONE HYDROCHLORIDE 5 MG/1
5 TABLET ORAL EVERY 4 HOURS
Refills: 0 | Status: DISCONTINUED | OUTPATIENT
Start: 2020-02-05 | End: 2020-02-07

## 2020-02-05 RX ORDER — ACETAMINOPHEN 500 MG
650 TABLET ORAL EVERY 6 HOURS
Refills: 0 | Status: DISCONTINUED | OUTPATIENT
Start: 2020-02-05 | End: 2020-02-07

## 2020-02-05 RX ORDER — IBUPROFEN 200 MG
600 TABLET ORAL EVERY 6 HOURS
Refills: 0 | Status: DISCONTINUED | OUTPATIENT
Start: 2020-02-05 | End: 2020-02-07

## 2020-02-05 RX ADMIN — ENOXAPARIN SODIUM 40 MILLIGRAM(S): 100 INJECTION SUBCUTANEOUS at 12:56

## 2020-02-05 RX ADMIN — Medication 50 GRAM(S): at 12:56

## 2020-02-05 RX ADMIN — Medication 650 MILLIGRAM(S): at 08:05

## 2020-02-05 RX ADMIN — Medication 600 MILLIGRAM(S): at 21:40

## 2020-02-05 RX ADMIN — Medication 650 MILLIGRAM(S): at 13:25

## 2020-02-05 RX ADMIN — Medication 650 MILLIGRAM(S): at 19:16

## 2020-02-05 RX ADMIN — Medication 650 MILLIGRAM(S): at 07:35

## 2020-02-05 RX ADMIN — Medication 650 MILLIGRAM(S): at 12:55

## 2020-02-05 RX ADMIN — Medication 75 MICROGRAM(S): at 05:58

## 2020-02-05 RX ADMIN — Medication 600 MILLIGRAM(S): at 10:12

## 2020-02-05 RX ADMIN — POTASSIUM PHOSPHATE, MONOBASIC POTASSIUM PHOSPHATE, DIBASIC 62.5 MILLIMOLE(S): 236; 224 INJECTION, SOLUTION INTRAVENOUS at 12:56

## 2020-02-05 RX ADMIN — HYDROMORPHONE HYDROCHLORIDE 30 MILLILITER(S): 2 INJECTION INTRAMUSCULAR; INTRAVENOUS; SUBCUTANEOUS at 02:25

## 2020-02-05 RX ADMIN — Medication 100 MILLIEQUIVALENT(S): at 11:22

## 2020-02-05 RX ADMIN — Medication 650 MILLIGRAM(S): at 18:46

## 2020-02-05 RX ADMIN — HYDROMORPHONE HYDROCHLORIDE 30 MILLILITER(S): 2 INJECTION INTRAMUSCULAR; INTRAVENOUS; SUBCUTANEOUS at 08:36

## 2020-02-05 RX ADMIN — Medication 100 MILLIEQUIVALENT(S): at 09:43

## 2020-02-05 RX ADMIN — Medication 600 MILLIGRAM(S): at 09:42

## 2020-02-05 RX ADMIN — Medication 600 MILLIGRAM(S): at 22:10

## 2020-02-05 NOTE — PROVIDER CONTACT NOTE (OTHER) - RECOMMENDATIONS
MD come and assess pt?
recheck BP, and it was 160/90 which is patients normal range
MD notified and aware. Please advise

## 2020-02-05 NOTE — PROGRESS NOTE ADULT - SUBJECTIVE AND OBJECTIVE BOX
A Team Surgery Progress Note     SUBJECTIVE / 24H EVENTS  Patient seen and examined on morning rounds. No acute events overnight. Patient reports passage of flatus, denies bowel function. She is tolerating her diet, ambulating, and urinating appropriately. She denies fevers, chills, nausea, or vomiting.     OBJECTIVE:    VITAL SIGNS:  T(C): 36.8 (02-05-20 @ 05:58), Max: 36.9 (02-04-20 @ 21:48)  HR: 90 (02-05-20 @ 05:58) (70 - 90)  BP: 157/81 (02-05-20 @ 05:58) (125/75 - 157/81)  RR: 16 (02-05-20 @ 05:58) (16 - 20)  SpO2: 96% (02-05-20 @ 05:58) (94% - 96%)      PHYSICAL EXAM:  Gen: NAD  LS: Respirations unlabored. CTA b/l  Card: RRR. No m/r/g  GI: Soft. Appropriately tender. Nondistended. Midline aquacell with minimal strikethrough. Port incisions c/d, DAWSON in place with ss output  Ext: Warm, well perfused      02-04-20 @ 07:01  -  02-05-20 @ 07:00  --------------------------------------------------------  IN:    dextrose 5% + sodium chloride 0.45%.: 2200 mL    IV PiggyBack: 150 mL    Oral Fluid: 320 mL  Total IN: 2670 mL    OUT:    Bulb: 162.5 mL    Indwelling Catheter - Urethral: 550 mL    Voided: 1550 mL  Total OUT: 2262.5 mL    Total NET: 407.5 mL        LAB VALUES:  02-04    139  |  104  |  8   ----------------------------<  162<H>  4.6   |  24  |  0.64    Ca    7.8<L>      04 Feb 2020 07:08  Phos  4.0     02-04  Mg     1.6     02-04                                 11.9   11.15 )-----------( 159      ( 04 Feb 2020 07:08 )             36.2         MICROBIOLOGY:    No new microbiology data for review.     RADIOLOGY:    No new radiographic images for review.    MEDICATIONS  (STANDING):  acetaminophen   Tablet .. 650 milliGRAM(s) Oral every 6 hours  dextrose 5% + sodium chloride 0.45%. 1000 milliLiter(s) (100 mL/Hr) IV Continuous <Continuous>  enoxaparin Injectable 40 milliGRAM(s) SubCutaneous daily  HYDROmorphone PCA (1 mG/mL) 30 milliLiter(s) PCA Continuous PCA Continuous  ibuprofen  Tablet. 600 milliGRAM(s) Oral every 6 hours  levothyroxine 75 MICROGram(s) Oral daily    MEDICATIONS  (PRN):  diphenhydrAMINE   Injectable 25 milliGRAM(s) IV Push every 4 hours PRN Pruritus  HYDROmorphone PCA (1 mG/mL) Rescue Clinician Bolus 0.5 milliGRAM(s) IV Push every 15 minutes PRN for Pain Scale GREATER THAN 6  naloxone Injectable 0.1 milliGRAM(s) IV Push every 3 minutes PRN For ANY of the following changes in patient status:  A. RR LESS THAN 10 breaths per minute, B. Oxygen saturation LESS THAN 90%, C. Sedation score of 6  ondansetron Injectable 4 milliGRAM(s) IV Push every 6 hours PRN Nausea  ondansetron Injectable 4 milliGRAM(s) IV Push once PRN Nausea and/or Vomiting

## 2020-02-05 NOTE — PROGRESS NOTE ADULT - SUBJECTIVE AND OBJECTIVE BOX
INTERVAL HPI/OVERNIGHT EVENTS: Pt seen and examined. Doing well. Tolerating CLD, denies N/V. Passing flatus and had a BM. Pain managed    STATUS POST:  Lap assisted LAR    POST OPERATIVE DAY #: 2    MEDICATIONS  (STANDING):  acetaminophen   Tablet .. 650 milliGRAM(s) Oral every 6 hours  dextrose 5% + sodium chloride 0.45%. 1000 milliLiter(s) (50 mL/Hr) IV Continuous <Continuous>  enoxaparin Injectable 40 milliGRAM(s) SubCutaneous daily  ibuprofen  Tablet. 600 milliGRAM(s) Oral every 6 hours  levothyroxine 75 MICROGram(s) Oral daily  magnesium sulfate  IVPB 2 Gram(s) IV Intermittent once  potassium phosphate IVPB 15 milliMole(s) IV Intermittent once    MEDICATIONS  (PRN):  ondansetron Injectable 4 milliGRAM(s) IV Push once PRN Nausea and/or Vomiting  oxyCODONE    IR 2.5 milliGRAM(s) Oral every 3 hours PRN Moderate Pain (4 - 6)  oxyCODONE    IR 5 milliGRAM(s) Oral every 3 hours PRN Severe Pain (7 - 10)      Vital Signs Last 24 Hrs  T(C): 36.7 (05 Feb 2020 10:38), Max: 36.9 (04 Feb 2020 21:48)  T(F): 98.1 (05 Feb 2020 10:38), Max: 98.4 (04 Feb 2020 21:48)  HR: 84 (05 Feb 2020 10:38) (77 - 90)  BP: 165/85 (05 Feb 2020 10:38) (125/75 - 165/85)  BP(mean): --  RR: 18 (05 Feb 2020 10:38) (16 - 20)  SpO2: 95% (05 Feb 2020 10:38) (94% - 96%)    PHYSICAL EXAM:      Constitutional: NAD, resting comfortably in hospital bed    Respiratory: breathing comfortably on RA    Gastrointestinal: Abd soft, NT, ND. Dressing c/d/i. Drain with SSF                I&O's Detail    04 Feb 2020 07:01  -  05 Feb 2020 07:00  --------------------------------------------------------  IN:    dextrose 5% + sodium chloride 0.45%.: 2200 mL    IV PiggyBack: 150 mL    Oral Fluid: 320 mL  Total IN: 2670 mL    OUT:    Bulb: 162.5 mL    Indwelling Catheter - Urethral: 550 mL    Voided: 1550 mL  Total OUT: 2262.5 mL    Total NET: 407.5 mL      05 Feb 2020 07:01  -  05 Feb 2020 11:28  --------------------------------------------------------  IN:  Total IN: 0 mL    OUT:    Bulb: 20 mL  Total OUT: 20 mL    Total NET: -20 mL          LABS:                        11.8   10.31 )-----------( 154      ( 05 Feb 2020 07:45 )             36.1     02-05    143  |  106  |  4<L>  ----------------------------<  134<H>  3.5   |  25  |  0.52    Ca    7.5<L>      05 Feb 2020 07:45  Phos  2.2     02-05  Mg     1.7     02-05            RADIOLOGY & ADDITIONAL STUDIES: INTERVAL HPI/OVERNIGHT EVENTS: Pt seen and examined. Doing well. Tolerating CLD, denies N/V. Passing flatus and had a BM. Pain managed    STATUS POST:  Lap assisted sigmoidectomy    POST OPERATIVE DAY #: 2    MEDICATIONS  (STANDING):  acetaminophen   Tablet .. 650 milliGRAM(s) Oral every 6 hours  dextrose 5% + sodium chloride 0.45%. 1000 milliLiter(s) (50 mL/Hr) IV Continuous <Continuous>  enoxaparin Injectable 40 milliGRAM(s) SubCutaneous daily  ibuprofen  Tablet. 600 milliGRAM(s) Oral every 6 hours  levothyroxine 75 MICROGram(s) Oral daily  magnesium sulfate  IVPB 2 Gram(s) IV Intermittent once  potassium phosphate IVPB 15 milliMole(s) IV Intermittent once    MEDICATIONS  (PRN):  ondansetron Injectable 4 milliGRAM(s) IV Push once PRN Nausea and/or Vomiting  oxyCODONE    IR 2.5 milliGRAM(s) Oral every 3 hours PRN Moderate Pain (4 - 6)  oxyCODONE    IR 5 milliGRAM(s) Oral every 3 hours PRN Severe Pain (7 - 10)      Vital Signs Last 24 Hrs  T(C): 36.7 (05 Feb 2020 10:38), Max: 36.9 (04 Feb 2020 21:48)  T(F): 98.1 (05 Feb 2020 10:38), Max: 98.4 (04 Feb 2020 21:48)  HR: 84 (05 Feb 2020 10:38) (77 - 90)  BP: 165/85 (05 Feb 2020 10:38) (125/75 - 165/85)  BP(mean): --  RR: 18 (05 Feb 2020 10:38) (16 - 20)  SpO2: 95% (05 Feb 2020 10:38) (94% - 96%)    PHYSICAL EXAM:      Constitutional: NAD, resting comfortably in hospital bed    Respiratory: breathing comfortably on RA    Gastrointestinal: Abd soft, NT, ND. Dressing c/d/i. Drain with SSF                I&O's Detail    04 Feb 2020 07:01  -  05 Feb 2020 07:00  --------------------------------------------------------  IN:    dextrose 5% + sodium chloride 0.45%.: 2200 mL    IV PiggyBack: 150 mL    Oral Fluid: 320 mL  Total IN: 2670 mL    OUT:    Bulb: 162.5 mL    Indwelling Catheter - Urethral: 550 mL    Voided: 1550 mL  Total OUT: 2262.5 mL    Total NET: 407.5 mL      05 Feb 2020 07:01  -  05 Feb 2020 11:28  --------------------------------------------------------  IN:  Total IN: 0 mL    OUT:    Bulb: 20 mL  Total OUT: 20 mL    Total NET: -20 mL          LABS:                        11.8   10.31 )-----------( 154      ( 05 Feb 2020 07:45 )             36.1     02-05    143  |  106  |  4<L>  ----------------------------<  134<H>  3.5   |  25  |  0.52    Ca    7.5<L>      05 Feb 2020 07:45  Phos  2.2     02-05  Mg     1.7     02-05            RADIOLOGY & ADDITIONAL STUDIES:

## 2020-02-05 NOTE — PROGRESS NOTE ADULT - SUBJECTIVE AND OBJECTIVE BOX
Anesthesia Pain Management Service    SUBJECTIVE:    Therapy:	  [ x] IV PCA	   [ ] Epidural           [ ] s/p Spinal Opoid              [ ] Postpartum infusion	  [ ] Patient controlled regional anesthesia (PCRA)    [ ] prn Analgesics    OBJECTIVE:   [x ] No new signs     [ ] Other:    Side Effects:  x[ ] None			[ ] Other:    Assessment of Catheter Site:		[ ] Intact		[ ] Other:    ASSESSMENT/PLAN  [ ] Continue current therapy    [x ] Therapy changed to:    [ ] IV PCA       [ ] Epidural     [ x] prn Analgesics     Comments:

## 2020-02-05 NOTE — PROGRESS NOTE ADULT - ASSESSMENT
59y female with history of malignant neoplasm of the sigmoid colon who is now s/p laparoscopic assisted sigmoid resection with side-to-end Baker's anastomosis    Plan:  - CLD; advance to LRD for lunch  - pain control IV APAP q6 and PCA  - OOB/amb  - DVT ppx lovenox      SOTERO Team A Pager: #[phone]

## 2020-02-05 NOTE — PROVIDER CONTACT NOTE (OTHER) - ASSESSMENT
Pt with /92. Temp 97.9, HR 80, RR 18, O2 saturation 96%. Denies chest pain, headaches.
patient laying in bed, no pain, asymptomatic, DAWSON has small amount of drainage, incision dressing c/d/i, no headache nor discomfort
Patient alert and oriented times 4, sleeping in between care, no signs of distress, asymptomatic

## 2020-02-05 NOTE — PROGRESS NOTE ADULT - SUBJECTIVE AND OBJECTIVE BOX
Anesthesia Pain Management Service    SUBJECTIVE: Patient is doing well with IV PCA and no significant problems reported.    Pain Scale Score	At rest: _2__ 	With Activity: ___ 	[X ] Refer to charted pain scores    THERAPY:    [ ] IV PCA Morphine		[ ] 5 mg/mL	[ ] 1 mg/mL  [X ] IV PCA Hydromorphone	[ ] 5 mg/mL	[X ] 1 mg/mL  [ ] IV PCA Fentanyl		[ ] 50 micrograms/mL    Demand dose __0.2_ lockout __6_ (minutes) Continuous Rate _0__ Total: __0_   mg used (in past 24 hrs)      MEDICATIONS  (STANDING):  acetaminophen   Tablet .. 650 milliGRAM(s) Oral every 6 hours  dextrose 5% + sodium chloride 0.45%. 1000 milliLiter(s) (100 mL/Hr) IV Continuous <Continuous>  enoxaparin Injectable 40 milliGRAM(s) SubCutaneous daily  ibuprofen  Tablet. 600 milliGRAM(s) Oral every 6 hours  levothyroxine 75 MICROGram(s) Oral daily    MEDICATIONS  (PRN):  ondansetron Injectable 4 milliGRAM(s) IV Push once PRN Nausea and/or Vomiting  oxyCODONE    IR 2.5 milliGRAM(s) Oral every 3 hours PRN Moderate Pain (4 - 6)  oxyCODONE    IR 5 milliGRAM(s) Oral every 3 hours PRN Severe Pain (7 - 10)      OBJECTIVE: laying in bed     Sedation Score:	[ X] Alert	[ ] Drowsy 	[ ] Arousable	[ ] Asleep	[ ] Unresponsive    Side Effects:	[X ] None	[ ] Nausea	[ ] Vomiting	[ ] Pruritus  		[ ] Other:    Vital Signs Last 24 Hrs  T(C): 36.8 (05 Feb 2020 05:58), Max: 36.9 (04 Feb 2020 21:48)  T(F): 98.3 (05 Feb 2020 05:58), Max: 98.4 (04 Feb 2020 21:48)  HR: 90 (05 Feb 2020 05:58) (70 - 90)  BP: 157/81 (05 Feb 2020 05:58) (125/75 - 157/81)  BP(mean): --  RR: 16 (05 Feb 2020 05:58) (16 - 20)  SpO2: 96% (05 Feb 2020 05:58) (94% - 96%)    ASSESSMENT/ PLAN    Therapy to  be:	[ ] Continue   [ X] Discontinued   [X ] Change to prn Analgesics    Documentation and Verification of current medications:   [X] Done	[ ] Not done, not elligible    Comments: PRN Oral/IV opioids and/or Adjuvant medication to be ordered at this point.

## 2020-02-05 NOTE — PROGRESS NOTE ADULT - ASSESSMENT
59F POD#2 s/p lap assisted LAR for malignant colon polyp. Recovering well.    - FLD  - pain control  - OOB/amb  - pt d/w Dr. Ashley 59F POD#2 s/p lap assisted sigmoidectomy for malignant colon polyp. Recovering well.    - FLD  - pain control  - OOB/amb  - pt d/w Dr. Ashley

## 2020-02-05 NOTE — PROVIDER CONTACT NOTE (OTHER) - BACKGROUND
ex lap to open LAR
s/p lap sigmoid resection converted to open on 2/3/20. no PMH of hypertension.
S/p lap sigmoid resection converted to open on 2/3

## 2020-02-05 NOTE — PROVIDER CONTACT NOTE (OTHER) - ACTION/TREATMENT ORDERED:
Continue to monitor pt. Contact team again if BP continues to increase.
MD notified, no action at this time
MD aware. Continue monitoring at this time. Contact if systolic above 170

## 2020-02-05 NOTE — PROVIDER CONTACT NOTE (OTHER) - SITUATION
Elevated BP
patient had a blood pressure of 186/86, was taken as soon as patient came back into bed
Patient is hypertensive 157/55 HR 82 at 21:48 and 156/89 HR:84 at 02:21. Patient reported nausea at change of shift. Nausea subsided after small emesis. Patient refuses nausea medication.

## 2020-02-06 ENCOUNTER — TRANSCRIPTION ENCOUNTER (OUTPATIENT)
Age: 60
End: 2020-02-06

## 2020-02-06 LAB
ANION GAP SERPL CALC-SCNC: 12 MMO/L — SIGNIFICANT CHANGE UP (ref 7–14)
BUN SERPL-MCNC: 6 MG/DL — LOW (ref 7–23)
CALCIUM SERPL-MCNC: 7.7 MG/DL — LOW (ref 8.4–10.5)
CHLORIDE SERPL-SCNC: 103 MMOL/L — SIGNIFICANT CHANGE UP (ref 98–107)
CO2 SERPL-SCNC: 25 MMOL/L — SIGNIFICANT CHANGE UP (ref 22–31)
CREAT SERPL-MCNC: 0.51 MG/DL — SIGNIFICANT CHANGE UP (ref 0.5–1.3)
GLUCOSE SERPL-MCNC: 88 MG/DL — SIGNIFICANT CHANGE UP (ref 70–99)
HCT VFR BLD CALC: 38.9 % — SIGNIFICANT CHANGE UP (ref 34.5–45)
HGB BLD-MCNC: 12.4 G/DL — SIGNIFICANT CHANGE UP (ref 11.5–15.5)
MAGNESIUM SERPL-MCNC: 2 MG/DL — SIGNIFICANT CHANGE UP (ref 1.6–2.6)
MCHC RBC-ENTMCNC: 28.8 PG — SIGNIFICANT CHANGE UP (ref 27–34)
MCHC RBC-ENTMCNC: 31.9 % — LOW (ref 32–36)
MCV RBC AUTO: 90.3 FL — SIGNIFICANT CHANGE UP (ref 80–100)
NRBC # FLD: 0 K/UL — SIGNIFICANT CHANGE UP (ref 0–0)
PHOSPHATE SERPL-MCNC: 3.8 MG/DL — SIGNIFICANT CHANGE UP (ref 2.5–4.5)
PLATELET # BLD AUTO: 167 K/UL — SIGNIFICANT CHANGE UP (ref 150–400)
PMV BLD: 12.5 FL — SIGNIFICANT CHANGE UP (ref 7–13)
POTASSIUM SERPL-MCNC: 4 MMOL/L — SIGNIFICANT CHANGE UP (ref 3.5–5.3)
POTASSIUM SERPL-SCNC: 4 MMOL/L — SIGNIFICANT CHANGE UP (ref 3.5–5.3)
RBC # BLD: 4.31 M/UL — SIGNIFICANT CHANGE UP (ref 3.8–5.2)
RBC # FLD: 13.9 % — SIGNIFICANT CHANGE UP (ref 10.3–14.5)
SODIUM SERPL-SCNC: 140 MMOL/L — SIGNIFICANT CHANGE UP (ref 135–145)
WBC # BLD: 8.28 K/UL — SIGNIFICANT CHANGE UP (ref 3.8–10.5)
WBC # FLD AUTO: 8.28 K/UL — SIGNIFICANT CHANGE UP (ref 3.8–10.5)

## 2020-02-06 RX ORDER — IBUPROFEN 200 MG
1 TABLET ORAL
Qty: 0 | Refills: 0 | DISCHARGE
Start: 2020-02-06

## 2020-02-06 RX ORDER — OXYCODONE HYDROCHLORIDE 5 MG/1
1 TABLET ORAL
Qty: 18 | Refills: 0
Start: 2020-02-06 | End: 2020-02-08

## 2020-02-06 RX ORDER — BENZOCAINE AND MENTHOL 5; 1 G/100ML; G/100ML
1 LIQUID ORAL
Refills: 0 | Status: DISCONTINUED | OUTPATIENT
Start: 2020-02-06 | End: 2020-02-07

## 2020-02-06 RX ADMIN — Medication 600 MILLIGRAM(S): at 22:32

## 2020-02-06 RX ADMIN — Medication 650 MILLIGRAM(S): at 18:14

## 2020-02-06 RX ADMIN — Medication 75 MICROGRAM(S): at 05:49

## 2020-02-06 RX ADMIN — Medication 650 MILLIGRAM(S): at 17:44

## 2020-02-06 RX ADMIN — Medication 650 MILLIGRAM(S): at 06:20

## 2020-02-06 RX ADMIN — Medication 600 MILLIGRAM(S): at 23:00

## 2020-02-06 RX ADMIN — Medication 650 MILLIGRAM(S): at 05:49

## 2020-02-06 RX ADMIN — BENZOCAINE AND MENTHOL 1 LOZENGE: 5; 1 LIQUID ORAL at 18:14

## 2020-02-06 NOTE — DISCHARGE NOTE PROVIDER - HOSPITAL COURSE
This 59 year old with history of malignant neoplasm of the sigmoid colon found on recent screening colonoscopy 12/2019. On 2/3 she underwent Laparoscopic-assisted sigmoid resection, Rigid sigmoidoscopy in the OR. The patient tolerated the procedure well. Post-operatively the patient was sent to the PACU. The patient was hemodynamically stable and was transferred to a surgical floor. Electrolyte abnormalities were corrected. The patient had daily wound care and was seen by physical therapy which recommended discharge to home/ with home PT. The patient's pain was controlled by IV pain medications and then by PO pain medications. The patient was advanced to a regular diet and tolerated it well. The patient was placed on home medications. At the time of discharge, the patient was hemodynamically stable, was tolerating PO diet, was voiding urine and passing stool, was ambulating, and was comfortable with adequate pain control. The patient was instructed to follow up with Dr. Ashley within 7-10 days after discharge from the hospital. The patient & family felt comfortable with discharge. The patient had no other issues. This 59 year old with history of malignant neoplasm of the sigmoid colon found on recent screening colonoscopy 12/2019. On 2/3 she underwent Laparoscopic-assisted sigmoid resection, Rigid sigmoidoscopy in the OR. The patient tolerated the procedure well. Post-operatively the patient was sent to the PACU. The patient was hemodynamically stable and was transferred to a surgical floor. Electrolyte abnormalities were corrected. The patient had daily wound care and was seen by physical therapy which recommended discharge to home/ with home PT. The patient's pain was controlled by IV pain medications and then by PO pain medications. The patient was advanced to a regular diet and tolerated it well. The patient was placed on home medications. At the time of discharge, the patient was hemodynamically stable, was tolerating PO diet, was voiding urine and passing stool, was ambulating, and was comfortable with adequate pain control. DAWSON removed. The patient was instructed to follow up with Dr. Ashley within 7-10 days after discharge from the hospital. The patient & family felt comfortable with discharge. The patient had no other issues.

## 2020-02-06 NOTE — DISCHARGE NOTE PROVIDER - NSDCACTIVITY_GEN_ALL_CORE
No heavy lifting/straining/Walking - Indoors allowed/Do not drive or operate machinery/No restrictions/Do not make important decisions

## 2020-02-06 NOTE — DISCHARGE NOTE PROVIDER - CARE PROVIDER_API CALL
Twin Ashley)  ColonRectal Surgery; Surgery  [address], [address]  [place] [address]  Phone: [phone]  Fax: [phone]  Follow Up Time:

## 2020-02-06 NOTE — DISCHARGE NOTE PROVIDER - NSDCFUADDINST_GEN_ALL_CORE_FT
You will be discharged with DAWSON drain. You will need to empty and record outputs accurately. This will be taught to you by the nursing staff. Please do not remove the DAWSON drains It will be removed in the office. Please bring to the office accurate records of output.

## 2020-02-06 NOTE — DISCHARGE NOTE PROVIDER - NSDCCPCAREPLAN_GEN_ALL_CORE_FT
PRINCIPAL DISCHARGE DIAGNOSIS  Diagnosis: Malignant neoplasm of sigmoid colon  Assessment and Plan of Treatment: You had surgery to remove your colon. Recover from surgery.      SECONDARY DISCHARGE DIAGNOSES  Diagnosis: Hypothyroid  Assessment and Plan of Treatment: Continue taking your Synthroid. Follow up with your primary care provider within one week of discharge.

## 2020-02-06 NOTE — PROGRESS NOTE ADULT - ASSESSMENT
59y female with history of malignant neoplasm of the sigmoid colon who is now s/p laparoscopic assisted sigmoid resection with side-to-end Baker's anastomosis  - FLD, advance to LRD  - Pain control with ibuprofen/acetaminophen, PRN oxycodone  - OOB/amb  - Continue home synthroid  - Drain teaching  - DVT ppx lovenox  - Discharge planning    J Team A Pager: #[phone]

## 2020-02-06 NOTE — PROGRESS NOTE ADULT - SUBJECTIVE AND OBJECTIVE BOX
A TEAM SURGERY PROGRESS NOTE    POST OPERATIVE DAY #: s/p lap assisted sigmoidectomy for malignant colon polyp    SUBJECTIVE: Patient seen and examined at bedside on AM rounds, patient without complaints. Tolerating full liquid diet. Passing flatus, had a bowel movement. Pain well controlled. Has been OOB/ambulating. Denies chest pain/SOB. Denies nausea/vomiting.    Vital Signs Last 24 Hrs  T(C): 36.6 (06 Feb 2020 05:48), Max: 36.7 (05 Feb 2020 10:38)  T(F): 97.9 (06 Feb 2020 05:48), Max: 98.1 (05 Feb 2020 10:38)  HR: 72 (06 Feb 2020 05:48) (72 - 84)  BP: 161/84 (06 Feb 2020 05:48) (157/94 - 186/86)  BP(mean): --  RR: 17 (06 Feb 2020 05:48) (16 - 18)  SpO2: 96% (06 Feb 2020 05:48) (95% - 99%)  I&O's Detail    05 Feb 2020 07:01  -  06 Feb 2020 07:00  --------------------------------------------------------  IN:    dextrose 5% + sodium chloride 0.45%.: 500 mL    dextrose 5% + sodium chloride 0.45%.: 150 mL    IV PiggyBack: 500 mL    Oral Fluid: 1020 mL  Total IN: 2170 mL    OUT:    Bulb: 75 mL    Voided: 200 mL  Total OUT: 275 mL    Total NET: 1895 mL      MEDICATIONS  (STANDING):  acetaminophen   Tablet .. 650 milliGRAM(s) Oral every 6 hours  enoxaparin Injectable 40 milliGRAM(s) SubCutaneous daily  ibuprofen  Tablet. 600 milliGRAM(s) Oral every 6 hours  levothyroxine 75 MICROGram(s) Oral daily    MEDICATIONS  (PRN):  ondansetron Injectable 4 milliGRAM(s) IV Push once PRN Nausea and/or Vomiting  oxyCODONE    IR 10 milliGRAM(s) Oral every 4 hours PRN Severe Pain (7 - 10)  oxyCODONE    IR 5 milliGRAM(s) Oral every 4 hours PRN Moderate Pain (4 - 6)      Physical Exam  General: A&Ox3, NAD  Respiratory: Clear bilaterally, equal bilateral expansion  Cardiovascular: Regular rate & rhythm  Abdominal: Soft, non-tender, non-distended, drain serosanguinous    LABS:                        11.8   10.31 )-----------( 154      ( 05 Feb 2020 07:45 )             36.1     02-05    143  |  106  |  4<L>  ----------------------------<  134<H>  3.5   |  25  |  0.52    Ca    7.5<L>      05 Feb 2020 07:45  Phos  2.2     02-05  Mg     1.7     02-05

## 2020-02-06 NOTE — DISCHARGE NOTE PROVIDER - NSDCMRMEDTOKEN_GEN_ALL_CORE_FT
Calcium 600+D oral tablet: 1 tab(s) orally 2 times a day  Daily Multi oral tablet: 1 tab(s) orally once a day. Last dose 1/28/20.  ibuprofen 600 mg oral tablet: 1 tab(s) orally every 6 hours  levothyroxine 75 mcg (0.075 mg) oral tablet: 1 tab(s) orally once a day  oxyCODONE 5 mg oral tablet: 1 tab(s) orally every 4 hours, As needed, Moderate to Severe Pain MDD:6  Tylenol 325 mg oral tablet: 2 tab(s) orally every 4 hours, As Needed  Vitamin D3 5000 intl units (125 mcg) oral tablet: 1 tab(s) orally once a day

## 2020-02-06 NOTE — DISCHARGE NOTE PROVIDER - NSDCCPTREATMENT_GEN_ALL_CORE_FT
PRINCIPAL PROCEDURE  Procedure: Laparoscopic resection of left or sigmoid colon  Findings and Treatment: WOUND CARE: Keep wound clean & dry. You will be discharged with DAWSON drain. You will need to empty and record outputs accurately. This will be taught to you by the nursing staff. Please do not remove the DAWSON drains It will be removed in the office. Please bring to the office accurate records of output.   BATHING: Please do not submerge wound underwater. You may shower and/or sponge bathe.  ACTIVITY: No heavy lifting or straining. Resume activities of daily living. Do not lift heavy weights (greater than 15 pounds) or engage in strenuous exercise until you see your doctor in the office in 1-2 weeks. You may shower today, just let the water run over the wound, no scrubbing. No immersion baths or swimming in pools or ocean until you see us in the office again. Please leave the steri-strips (small white bandaids) on. These will remain on and fall off by themselves in the next few weeks. If you are taking narcotic pain medication (such as Percocet), do NOT drive a car, operate machinery or make important decisions.  DIET: Return to your usual diet.  NOTIFY YOUR SURGEON IF: You have any bleeding that does not stop, any pus draining from your wound, any fever (over 100.4 F) or chills, persistent nausea/vomiting, persistent diarrhea, or if your pain is not controlled on your discharge pain medications.  FOLLOW-UP:  1. Please call to make a follow-up appointment within one week of discharge with Dr. Ashley  2. Please follow up with your primary care provider in one week regarding your hospitalization, please bring all discharge paperwork with you to this follow up appointment. PRINCIPAL PROCEDURE  Procedure: Laparoscopic resection of left or sigmoid colon  Findings and Treatment: WOUND CARE: Keep wound clean & dry.  BATHING: Please do not submerge wound underwater. You may shower and/or sponge bathe.  ACTIVITY: No heavy lifting or straining. Resume activities of daily living. Do not lift heavy weights (greater than 15 pounds) or engage in strenuous exercise until you see your doctor in the office in 1-2 weeks. You may shower today, just let the water run over the wound, no scrubbing. No immersion baths or swimming in pools or ocean until you see us in the office again. Please leave the Please allow steri-strips to fall off on their own.  Ok to shower and rinse wound with warm soapy water.  Do not scrub wound, pat dry. Please call the doctor immediately if you develop fever, chills, inability to tolerate liquid or food, diarrhea, nausea, vomiting or increased abdominal pain. Follow a regular diet. Do not drive or operate machinery while taking narcotic pain medication. Please follow up with your surgeon and primary care doctor within 1 week of discharge.strips (small white bandaids) on. These will remain on and fall off by themselves in the next few weeks. If you are taking narcotic pain medication (such as Percocet), do NOT drive a car, operate machinery or make important decisions.  DIET: Return to your usual diet.  NOTIFY YOUR SURGEON IF: You have any bleeding that does not stop, any pus draining from your wound, any fever (over 100.4 F) or chills, persistent nausea/vomiting, persistent diarrhea, or if your pain is not controlled on your discharge pain medications.  FOLLOW-UP:  1. Please call to make a follow-up appointment within one week of discharge with Dr. Ashley  2. Please follow up with your primary care provider in one week regarding your hospitalization, please bring all discharge paperwork with you to this follow up appointment.

## 2020-02-07 ENCOUNTER — TRANSCRIPTION ENCOUNTER (OUTPATIENT)
Age: 60
End: 2020-02-07

## 2020-02-07 VITALS
HEART RATE: 96 BPM | DIASTOLIC BLOOD PRESSURE: 83 MMHG | TEMPERATURE: 99 F | SYSTOLIC BLOOD PRESSURE: 153 MMHG | RESPIRATION RATE: 16 BRPM | OXYGEN SATURATION: 100 %

## 2020-02-07 LAB
ANION GAP SERPL CALC-SCNC: 13 MMO/L — SIGNIFICANT CHANGE UP (ref 7–14)
BUN SERPL-MCNC: 12 MG/DL — SIGNIFICANT CHANGE UP (ref 7–23)
CALCIUM SERPL-MCNC: 8.1 MG/DL — LOW (ref 8.4–10.5)
CHLORIDE SERPL-SCNC: 103 MMOL/L — SIGNIFICANT CHANGE UP (ref 98–107)
CO2 SERPL-SCNC: 24 MMOL/L — SIGNIFICANT CHANGE UP (ref 22–31)
CREAT SERPL-MCNC: 0.54 MG/DL — SIGNIFICANT CHANGE UP (ref 0.5–1.3)
GLUCOSE SERPL-MCNC: 91 MG/DL — SIGNIFICANT CHANGE UP (ref 70–99)
HCT VFR BLD CALC: 39.4 % — SIGNIFICANT CHANGE UP (ref 34.5–45)
HGB BLD-MCNC: 12.9 G/DL — SIGNIFICANT CHANGE UP (ref 11.5–15.5)
MAGNESIUM SERPL-MCNC: 2 MG/DL — SIGNIFICANT CHANGE UP (ref 1.6–2.6)
MCHC RBC-ENTMCNC: 29.1 PG — SIGNIFICANT CHANGE UP (ref 27–34)
MCHC RBC-ENTMCNC: 32.7 % — SIGNIFICANT CHANGE UP (ref 32–36)
MCV RBC AUTO: 88.9 FL — SIGNIFICANT CHANGE UP (ref 80–100)
NRBC # FLD: 0 K/UL — SIGNIFICANT CHANGE UP (ref 0–0)
PHOSPHATE SERPL-MCNC: 4.2 MG/DL — SIGNIFICANT CHANGE UP (ref 2.5–4.5)
PLATELET # BLD AUTO: 190 K/UL — SIGNIFICANT CHANGE UP (ref 150–400)
PMV BLD: 12.3 FL — SIGNIFICANT CHANGE UP (ref 7–13)
POTASSIUM SERPL-MCNC: 3.5 MMOL/L — SIGNIFICANT CHANGE UP (ref 3.5–5.3)
POTASSIUM SERPL-SCNC: 3.5 MMOL/L — SIGNIFICANT CHANGE UP (ref 3.5–5.3)
RBC # BLD: 4.43 M/UL — SIGNIFICANT CHANGE UP (ref 3.8–5.2)
RBC # FLD: 13.7 % — SIGNIFICANT CHANGE UP (ref 10.3–14.5)
SODIUM SERPL-SCNC: 140 MMOL/L — SIGNIFICANT CHANGE UP (ref 135–145)
WBC # BLD: 9.42 K/UL — SIGNIFICANT CHANGE UP (ref 3.8–10.5)
WBC # FLD AUTO: 9.42 K/UL — SIGNIFICANT CHANGE UP (ref 3.8–10.5)

## 2020-02-07 RX ADMIN — Medication 650 MILLIGRAM(S): at 11:47

## 2020-02-07 RX ADMIN — Medication 75 MICROGRAM(S): at 05:29

## 2020-02-07 RX ADMIN — ENOXAPARIN SODIUM 40 MILLIGRAM(S): 100 INJECTION SUBCUTANEOUS at 11:47

## 2020-02-07 NOTE — PROGRESS NOTE ADULT - ASSESSMENT
59y female with history of malignant neoplasm of the sigmoid colon who is now s/p laparoscopic assisted sigmoid resection with side-to-end Baker's anastomosis    - discontinue DAWSON drain  - discharge home today   - Pain control with ibuprofen/acetaminophen, PRN oxycodone  - OOB/amb  - resume all home medications      SOTERO Team A Pager: #[phone]

## 2020-02-07 NOTE — DISCHARGE NOTE NURSING/CASE MANAGEMENT/SOCIAL WORK - PATIENT PORTAL LINK FT
You can access the FollowMyHealth Patient Portal offered by [facility] by registering at the following website: [email or web address]. By joining [email or web address] [email or web address] portal, you will also be able to view your health information using other applications (apps) compatible with our system.

## 2020-02-07 NOTE — DISCHARGE NOTE NURSING/CASE MANAGEMENT/SOCIAL WORK - NSDCPNINST_GEN_ALL_CORE
Please NOTIFY MD for any of the following signs of infection (Fever >100.4, chills, increased redness, increased bleeding, pus-like drainage from incision line), uncontrolled pain not relieved by pain medications, persistent nausea/vomiting or inability to tolerate diet. No heavy lifting; No driving while taking narcotic pain medications. Please drink 6-8 glasses of water daily to stay hydrated.

## 2020-02-07 NOTE — PROGRESS NOTE ADULT - SUBJECTIVE AND OBJECTIVE BOX
INTERVAL EVENTS: Patient advanced to LRD and tolerating. No acute events overnight.       SUBJECTIVE: Pt seen and examined at bedside. Reports feeling well, tolerating diet. +/+ bowel function. Pain minimal and controlled. No other complaints.           OBJECTIVE:     Vital Signs Last 24 Hrs  T(C): 36.4 (07 Feb 2020 05:30), Max: 37.1 (06 Feb 2020 17:28)  T(F): 97.6 (07 Feb 2020 05:30), Max: 98.8 (06 Feb 2020 17:28)  HR: 92 (07 Feb 2020 05:30) (82 - 99)  BP: 145/79 (07 Feb 2020 05:30) (144/85 - 159/91)  BP(mean): --  RR: 17 (07 Feb 2020 05:30) (17 - 18)  SpO2: 100% (07 Feb 2020 05:30) (96% - 100%)      Physical Exam:  General Appearance: No acute discomfort. Laying in bed.   Neck: Supple  Chest: non-labored breathing, no respiratory distress  CV: regular rate and rhythm   Abdomen: Soft, non-tender, non-distended. Drain with serosanguinous OP  Extremities: warm and well perfused. Distal pulses in tact bilateral lower extremities.       I&O's Summary    05 Feb 2020 07:01  -  06 Feb 2020 07:00  --------------------------------------------------------  IN: 2170 mL / OUT: 275 mL / NET: 1895 mL    06 Feb 2020 07:01  -  07 Feb 2020 06:42  --------------------------------------------------------  IN: 1020 mL / OUT: 364.5 mL / NET: 655.5 mL      I&O's Detail    05 Feb 2020 07:01  -  06 Feb 2020 07:00  --------------------------------------------------------  IN:    dextrose 5% + sodium chloride 0.45%.: 500 mL    dextrose 5% + sodium chloride 0.45%.: 150 mL    IV PiggyBack: 500 mL    Oral Fluid: 1020 mL  Total IN: 2170 mL    OUT:    Bulb: 75 mL    Voided: 200 mL  Total OUT: 275 mL    Total NET: 1895 mL      06 Feb 2020 07:01  -  07 Feb 2020 06:42  --------------------------------------------------------  IN:    Oral Fluid: 1020 mL  Total IN: 1020 mL    OUT:    Bulb: 62.5 mL    Voided: 302 mL  Total OUT: 364.5 mL    Total NET: 655.5 mL              LABS:                        12.4   8.28  )-----------( 167      ( 06 Feb 2020 06:35 )             38.9     02-06    140  |  103  |  6<L>  ----------------------------<  88  4.0   |  25  |  0.51    Ca    7.7<L>      06 Feb 2020 06:35  Phos  3.8     02-06  Mg     2.0     02-06                MEDICATIONS  (STANDING):  acetaminophen   Tablet .. 650 milliGRAM(s) Oral every 6 hours  enoxaparin Injectable 40 milliGRAM(s) SubCutaneous daily  ibuprofen  Tablet. 600 milliGRAM(s) Oral every 6 hours  levothyroxine 75 MICROGram(s) Oral daily    MEDICATIONS  (PRN):  benzocaine 15 mG/menthol 3.6 mG (Sugar-Free) Lozenge 1 Lozenge Oral every 1 hour PRN Sore Throat  ondansetron Injectable 4 milliGRAM(s) IV Push once PRN Nausea and/or Vomiting  oxyCODONE    IR 10 milliGRAM(s) Oral every 4 hours PRN Severe Pain (7 - 10)  oxyCODONE    IR 5 milliGRAM(s) Oral every 4 hours PRN Moderate Pain (4 - 6)          RADIOLOGY & ADDITIONAL STUDIES:

## 2021-04-18 PROBLEM — Z86.39 PERSONAL HISTORY OF OTHER ENDOCRINE, NUTRITIONAL AND METABOLIC DISEASE: Chronic | Status: ACTIVE | Noted: 2020-01-28

## 2021-04-18 PROBLEM — E03.9 HYPOTHYROIDISM, UNSPECIFIED: Chronic | Status: ACTIVE | Noted: 2020-01-28

## 2021-04-18 PROBLEM — C18.7 MALIGNANT NEOPLASM OF SIGMOID COLON: Chronic | Status: ACTIVE | Noted: 2020-01-28

## 2021-04-18 PROBLEM — Z98.890 OTHER SPECIFIED POSTPROCEDURAL STATES: Chronic | Status: ACTIVE | Noted: 2020-01-28

## 2021-05-18 ENCOUNTER — APPOINTMENT (OUTPATIENT)
Dept: DISASTER EMERGENCY | Facility: OTHER | Age: 61
End: 2021-05-18

## 2021-06-11 ENCOUNTER — APPOINTMENT (OUTPATIENT)
Dept: CT IMAGING | Facility: IMAGING CENTER | Age: 61
End: 2021-06-11
Payer: COMMERCIAL

## 2021-06-11 ENCOUNTER — OUTPATIENT (OUTPATIENT)
Dept: OUTPATIENT SERVICES | Facility: HOSPITAL | Age: 61
LOS: 1 days | End: 2021-06-11
Payer: COMMERCIAL

## 2021-06-11 DIAGNOSIS — Z98.890 OTHER SPECIFIED POSTPROCEDURAL STATES: Chronic | ICD-10-CM

## 2021-06-11 DIAGNOSIS — Z00.8 ENCOUNTER FOR OTHER GENERAL EXAMINATION: ICD-10-CM

## 2021-06-11 PROCEDURE — 74177 CT ABD & PELVIS W/CONTRAST: CPT | Mod: 26

## 2021-06-11 PROCEDURE — 82565 ASSAY OF CREATININE: CPT

## 2021-06-11 PROCEDURE — 74177 CT ABD & PELVIS W/CONTRAST: CPT

## 2021-12-09 NOTE — H&P PST ADULT - DOES PATIENT MEET CRITERIA FOR SEPSIS
History of Present Illness:  86 yo woman with RA, OA, OP. She has been having pain in the knees, severe at times, the pain is worse with walking, has been getting steroid injections every 3 months with relief.  She has been having vascular issues in the right leg, had stent placed and one toe was amputated bc of infection. She has had minor falls lately, no fractures.     PHYSICAL EXAMINATION:  VITAL SIGNS:   Vitals:    12/09/21 1321   BP: 112/60   Pulse: 98     MUSCULOSKELETAL:   Bilateral lower extremities:  Small effusion right  knee         Assessment and Plan:  1. RA: stable, continue with methotrexate 2 tab weekly         2. OA: diffuse, aspirate right knee and inject both .  Procedure:   Informed consent was obtained. Site was marked and cleaned with ChloraPrep. 1% lidocaine used for local anesthesia. Using a 21 gauge needle, aspiration attempted and about 25 ml of fluid removed from right knee, then 40 mg of kenalog injected with out any complications     Procedure:   Informed consent obtained. Site marked and cleaned with ChloraPrep. Ethyl chloride spray used for local anesthesia. Using 25 gauge needle, 40 mg of kenalog(triamcinolone) injected in the left knee without any complications.       3. Osteoporosis: new, high fracture risk, start Denosumab, first dose given today     4.  Monitoring for methotrexate:  Check CBC, AST,ALT every 3 months, recent labs reviewed       RTC 3 m             
No

## 2023-01-19 NOTE — PATIENT PROFILE ADULT - BRADEN SCORE
[facility] INPATIENT ENCOUNTER  CRITICAL CARE DAILY PROGRESS NOTE    ADMISSION DATE:  1/16/2023  DATE:  1/19/2023  CURRENT HOSPITAL DAY:  Hospital Day: 4  ATTENDING PHYSICIAN:  Alvaro Thrasher DO  CODE STATUS:  Full Resuscitation    IMPRESSION:   1. S/P exploratory laparotomy, total proctocolectomy, and ileostomy-pancolitis  2. Cholangitis/liver abscesses  3. Severe sepsis-gram + bacilli blood cultures  4. Severe hypotension-2 ° # 3  5. Postop respiratory failure-extubated 1/18  6. History of Crohn's disease on immune suppression  7. Elevated LFTs  8. ABDULAZIZ-worsening    9. Emphysema  10. History of tobacco abuse          PLAN:   1. Management per General surgery  2. ID following-on Merrem-repeat blood cultures without growth  3. Maintain MAP > 65, continues on Deepak-Synephrine  4. Solu-Cortef 100 mg q.8  5. Maintain O2 sats greater than 90%  6. Monitor LFTs  7. Nephrology consultation-plan CVVH-IR catheter placement  8. DVT and GI prophylaxis           CHIEF COMPLAINT:  Severe sepsis     SUBJECTIVE:  Overnight events reviewed .  The patient is now on high-flow oxygen 45 liters/minute and FiO2 80%.  Remains on Deepak-Synephrine.    MEDICATIONS:  SCHEDULED MEDS:  Current Facility-Administered Medications   Medication Dose Route Frequency Provider Last Rate Last Admin   • sodium chloride (PF) 0.9 % injection 10 mL  10 mL Injection 2 times per day Alvaro Thrasher DO       • sodium chloride (PF) 0.9 % injection 10 mL  10 mL Injection 2 times per day Alvaro Thrasher DO   10 mL at 01/19/23 0905   • sodium chloride (PF) 0.9 % injection 10 mL  10 mL Injection 2 times per day Alvaro Thrasher DO   10 mL at 01/19/23 0905   • sodium chloride (PF) 0.9 % injection 10 mL  10 mL Injection 2 times per day Alvaro Thrasher, DO   10 mL at 01/19/23 0905   • insulin regular (human) (HumuLIN R, NovoLIN R) Correction Dose   Subcutaneous 4 times per day Alvaro Thrasher DO       • acetylcysteine (MUCOMYST) 20 % nebulizer solution 400 mg  400 mg Nebulization 4x Daily Resp  Vandana Mckeon APNP   400 mg at 01/19/23 1031   • ipratropium-albuterol (DUONEB) 0.5-2.5 (3) MG/3ML nebulizer solution 3 mL  3 mL Nebulization 4x Daily Resp Vandana Mckeon APNP   3 mL at 01/19/23 1031   • polyvinyl alcohol (LIQUITEARS) 1.4 % ophthalmic solution 1 drop  1 drop Both Eyes TID Alvaro Thrasher, DO   1 drop at 01/19/23 0858   • Potassium Standard Replacement Protocol (Levels 3.5 and lower)   Does not apply See Admin Instructions Janusz Vega MD       • Potassium Replacement (Levels 3.6 - 4)   Does not apply See Admin Instructions Janusz Vega MD       • Magnesium Standard Replacement Protocol   Does not apply See Admin Instructions Janusz Vega MD       • Phosphorus Standard Replacement Protocol   Does not apply See Admin Instructions Janusz Vega MD       • hydroCORTisone (Solu-CORTEF) PF injection 100 mg  100 mg Intravenous 3 times per day Janusz Vega MD   100 mg at 01/19/23 0547   • pantoprazole (PROTONIX INJECT) injection 40 mg  40 mg Intravenous Nightly Janusz Vega MD   40 mg at 01/18/23 2027   • nicotine (NICODERM) 21 MG/24HR patch 1 patch  1 patch Transdermal Daily Janusz Vega MD   1 patch at 01/18/23 0936   • meropenem (MERREM) 1 g in sodium chloride 0.9 % 100 mL IVPB  1 g Intravenous 2 times per day Janusz Vega MD 33.3 mL/hr at 01/19/23 0904 1 g at 01/19/23 0904   • sodium chloride (PF) 0.9 % injection 2 mL  2 mL Intracatheter 2 times per day Janusz Vega MD   2 mL at 01/19/23 0906       CONTINUOUS INFUSIONS:  Current Facility-Administered Medications   Medication Dose Route Frequency Provider Last Rate Last Admin   • lactated ringers infusion   Intravenous Continuous Janusz Vega MD   Stopped at 01/19/23 0400   • PHENYLephrine (CHRISTIANO-SYNEPHRINE) 150 mg/250 mL in sodium chloride 0.9 % infusion  0-300 mcg/min Intravenous Continuous Janusz Vega MD 10 mL/hr at 01/19/23 0943 100 mcg/min at 01/19/23 0943   • sodium chloride 0.9% infusion   Intravenous Continuous  PRN Janusz Vega MD       • sodium chloride 0.9% infusion   Intravenous Continuous PRN Janusz Vega MD   Stopped at 01/19/23 0408   • NORepinephrine (LEVOPHED) 8 mg/250 mL in dextrose 5 % infusion  0-30 mcg/min Intravenous Continuous Serge Hidalgo MD   Stopped at 01/19/23 0502   • vasopressin (VASOSTRICT) 20 unit/100 mL dextrose 5 % infusion  0-0.1 Units/min Intravenous Continuous Serge Hidalgo MD   Stopped at 01/17/23 1122       PRN MEDS:  Current Facility-Administered Medications   Medication Dose Route Frequency Provider Last Rate Last Admin   • fentaNYL (SUBLIMAZE) injection 25 mcg  25 mcg Intravenous Q2H PRN Janusz Vega MD   25 mcg at 01/19/23 0828   • sodium chloride (PF) 0.9 % injection 10 mL  10 mL Injection PRN [facility]ras[facility], DO       • sodium chloride (PF) 0.9 % injection 10 mL  10 mL Injection PRN [facility] Thras[facility], DO       • sodium chloride (PF) 0.9 % injection 10 mL  10 mL Injection PRN [facility] Thras[facility], DO       • sodium chloride (PF) 0.9 % injection 20 mL  20 mL Injection PRN [facility] Thrasher, DO       • sodium chloride (PF) 0.9 % injection 10 mL  10 mL Injection PRN [facility] Thras[facility], DO       • sodium chloride (PF) 0.9 % injection 20 mL  20 mL Injection PRN [facility] Thras[facility], DO       • dextrose 50 % injection 25 g  25 g Intravenous PRN Hung N Thrasher, DO       • dextrose 50 % injection 12.5 g  12.5 g Intravenous PRN Hung N Thrasher, DO       • glucagon (GLUCAGEN) injection 1 mg  1 mg Intramuscular PRN Hung N Thrasher, DO       • dextrose (GLUTOSE) 40 % gel 15 g  15 g Oral PRN Hung N Thrasher, DO       • dextrose (GLUTOSE) 40 % gel 30 g  30 g Oral PRN [facility] Thras[facility], DO       • albuterol (VENTOLIN) nebulizer 2.5 mg  2.5 mg Nebulization Q4H Resp PRN Serge Hidalgo MD   2.5 mg at 01/19/23 0343   • sodium chloride 0.9% infusion   Intravenous Continuous PRN Janusz Vega MD       • sodium chloride 0.9% infusion   Intravenous Continuous PRN Janusz Vega MD   Stopped at 01/19/23 6895   • sodium chloride 0.9 % flush bag 25  mL  25 mL Intravenous PRN Janusz Vega MD       • sodium chloride (NORMAL SALINE) 0.9 % bolus 500 mL  500 mL Intravenous PRN Janusz Vega MD       • ondansetron (ZOFRAN) injection 4 mg  4 mg Intravenous BID PRN Janusz Vega MD       • acetaminophen (TYLENOL) suppository 650 mg  650 mg Rectal Q4H PRN Alvaro Thrasher, DO   650 mg at 01/17/23 1656   • sodium chloride 0.9 % flush bag 25 mL  25 mL Intravenous PRN Janusz Vega MD           HISTORIES:  I have reviewed the past medical history, family history, social history, medications and allergies listed in the medical record as well as the nursing notes for this encounter.    OBJECTIVE:  VITAL SIGNS:   Vital Last Value 24 Hour Range   Temperature 97.5 °F (36.4 °C) (01/19/23 0945) Temp  Min: 97.5 °F (36.4 °C)  Max: 99 °F (37.2 °C)   Pulse 99 (01/19/23 0945) Pulse  Min: 71  Max: 107   Respiratory 20 (01/19/23 0945) Resp  Min: 13  Max: 39   Non-Invasive  Blood Pressure 105/66 (01/19/23 0945) BP  Min: 69/48  Max: 128/66   Pulse Oximetry 98 % (01/19/23 1030) SpO2  Min: 86 %  Max: 100 %     Vital Today Admitted   Weight 61.2 kg (134 lb 14.7 oz) (01/19/23 0553) Weight: 59 kg (130 lb) (01/16/23 1632)   Height N/A Height: 5' 2\" (157.5 cm) (01/16/23 1632)   BMI N/A BMI (Calculated): 23.78 (01/16/23 1632)       VENT SETTINGS LAST 24 HOURS:  FiO2 (%):  [70 %-100 %] 70 %      PHYSICAL EXAM:  General:  Weak, ill-appearing  Skin:  Skin color, texture, and turgor normal.  No rashes or lesions.  Head:  Normocephalic, without obvious abnormality, atraumatic.  Eyes:  PERRL, conjunctivae/corneas clear.  Nose: Nares normal. Septum midline.  Throat:  Lips, mucosa, and tongue normal; teeth and gums normal.  Neck: Supple, symmetrical.  Trachea midline. No adenopathy.  Lungs:  Decreased breath sounds  Heart: Regular rate and rhythm. S1 and S2 normal. No murmur, rub or gallop.  Abdomen:  Soft, Bowel sounds hypo active in all four quadrants.  Ileostomy present, weeping from abdominal  incision.  Ileostomy present  Extremities:  Tips of fingers on right hand cyanotic, generalized edema present  Neuro:  Awake, responds slowly    LABORATORY DATA:  Recent Labs   Lab 01/19/23  0949 01/18/23 2036 01/18/23 0327 01/17/23 2032 01/16/23 2056 01/16/23  0900   FSTS1  --   --   --   --   --  13  13   SODIUM 141  --  142 142   < > 131*   POTASSIUM 4.1 4.1 3.1* 3.3*   < > 2.9*   CHLORIDE 105  --  104 105   < > 88*   CO2 25  --  23 22   < > 29   ANIONGAP 15  --  18 18   < > 17   GLUCOSE 137*  --  303* 234*   < > 119*   BUN 51*  --  28* 25*   < > 12   CREATININE 2.95*  --  1.92* 1.72*   < > 0.61   BCRAT 17  --  15 15   < > 20   CALCIUM 6.8*  --  6.5* 6.4*   < > 8.8   BILIRUBIN 3.8*  --  3.3* 3.3*   < > 0.8   *  --  1,639* 2,260*   < > 36   GPT 1,061*  --  1,362* 1,486*   < > 58   ALKPT 199*  --  167* 179*   < > 157*   TOTPROTEIN 3.9*  --  3.7* 3.9*   < > 7.0   ALBUMIN 1.5*  --  1.8* 2.0*   < > 3.0*   GLOB 2.4  --  1.9* 1.9*   < > 4.0   AGR 0.6*  --  0.9* 1.1   < > 0.8*    < > = values in this interval not displayed.     Recent Labs   Lab 01/19/23 0949 01/18/23 0327 01/17/23 2032   ALBUMIN 1.5* 1.8* 2.0*   BILIRUBIN 3.8* 3.3* 3.3*   ALKPT 199* 167* 179*   GPT 1,061* 1,362* 1,486*   * 1,639* 2,260*   TOTPROTEIN 3.9* 3.7* 3.9*     No results found  Recent Labs   Lab 01/19/23  0949 01/18/23 0327 01/17/23 2032   WBC 27.0* 33.9* 33.3*   HCT 26.0* 26.9* 26.3*   HGB 9.0* 9.0* 8.8*   PLT 61* 135* 145     Recent Labs   Lab 01/19/23  0352 01/18/23  1522 01/18/23 0327   APH 7.55* 7.45 7.47*   APO2 59* 60* 63*   ASAT 94* 92* 93*   AHCO3 25 23 21*   APCO2 28* 32 29*     Troponin I, High Sensitivity: No results found  No results found for: NTPROB  No results found for: PCT  Lactate, Venous (mmol/L)   Date Value   01/19/2023 3.3 (HH)     No results found for: DDIMER    No results found  No results found  Anaerobic and Aerobic Culture and Smear   Gram Stain (no units)   Date Value   01/16/2023 Gram  positive bacilli. (AA)     No results found for: CULT           IMAGING STUDIES:    Radiographic images have been reviewed by me personally.      Results for orders placed during the hospital encounter of 01/16/23    XR Chest AP or PA    Impression  Impression:    Stable right greater than left pleural effusions and adjacent opacities.      On 1/19/2023, IVandana APNP scribed the services personally performed by John Paul Rice MD         Patient is critically ill    35 minutes of critical care    __________  The documentation recorded by the scribe accurately and completely reflects the service(s) I personally performed and the decisions made during the visit today, 01/19/23    John Paul Rice MD  Pulmonary and Critical Care                         19

## 2024-05-07 NOTE — H&P PST ADULT - VENOUS THROMBOEMBOLISM AGE
(1) 41-60 years
Vital signs: Blood pressure 98/65, respirations 16, heart rate 78, temperature 98.1, O2 sat 97% on room air  General: Elderly female, chronically ill-appearing, no acute distress awake and alert neck: Supple  No midline tenderness to palpation  PERRL, EOMI.    Resp:  No distress, CTA B.    Cardiac RRR, no RMG.    Abdomen:  soft, nondistended, +LUQ costal margin TTP.   Ext: no deformities, left hip with overlying ecchymosis that is tender to palpation.  Lower extremities are equal in length full range of motion without pain  Neuro:  A&Ox4 appears non focal.  Moving all 4 extremities equally  Skin:  thin skin, otherwise arm and dry as visualized, no rash.     Psych:  Normal affect and mood.

## 2024-06-02 NOTE — H&P PST ADULT - NSANTHOSAYNRD_GEN_A_CORE
6 (moderate pain)
No. TOM screening performed.  STOP BANG Legend: 0-2 = LOW Risk; 3-4 = INTERMEDIATE Risk; 5-8 = HIGH Risk

## 2024-08-15 NOTE — ASU PATIENT PROFILE, ADULT - PASSIVE COMMENT
PT presents to the ED via walk-in for dislocated shoulder. Pt sent over from [facility]. Pt well appearing.  
Spouse